# Patient Record
Sex: MALE | Race: ASIAN | NOT HISPANIC OR LATINO
[De-identification: names, ages, dates, MRNs, and addresses within clinical notes are randomized per-mention and may not be internally consistent; named-entity substitution may affect disease eponyms.]

---

## 2019-03-12 ENCOUNTER — APPOINTMENT (OUTPATIENT)
Dept: SURGICAL ONCOLOGY | Facility: CLINIC | Age: 74
End: 2019-03-12
Payer: MEDICARE

## 2019-03-12 VITALS
SYSTOLIC BLOOD PRESSURE: 112 MMHG | DIASTOLIC BLOOD PRESSURE: 75 MMHG | WEIGHT: 112 LBS | BODY MASS INDEX: 18.44 KG/M2 | TEMPERATURE: 98.5 F | HEART RATE: 90 BPM | HEIGHT: 65.35 IN | RESPIRATION RATE: 17 BRPM | OXYGEN SATURATION: 98 %

## 2019-03-12 DIAGNOSIS — Z78.9 OTHER SPECIFIED HEALTH STATUS: ICD-10-CM

## 2019-03-12 PROBLEM — Z00.00 ENCOUNTER FOR PREVENTIVE HEALTH EXAMINATION: Status: ACTIVE | Noted: 2019-03-12

## 2019-03-12 PROCEDURE — 99205 OFFICE O/P NEW HI 60 MIN: CPT

## 2019-03-12 NOTE — PHYSICAL EXAM
[Normal] : supple, no neck mass and thyroid not enlarged [Normal Neck Lymph Nodes] : normal neck lymph nodes  [Normal Supraclavicular Lymph Nodes] : normal supraclavicular lymph nodes [Normal Groin Lymph Nodes] : normal groin lymph nodes [Normal Axillary Lymph Nodes] : normal axillary lymph nodes [Normal] : oriented to person, place and time, with appropriate affect

## 2019-03-12 NOTE — REASON FOR VISIT
[Initial Consultation] : an initial consultation for [Colon Cancer] : colon cancer [Referred By: ___] : Referred By: [unfilled]

## 2019-03-12 NOTE — ASSESSMENT
[FreeTextEntry1] : 72 y/o male with newly diagnosed hepatic flexure cancer.\par No evidence of distant metastasis.\par \par Plan:  I reviewed his recent CT abdomen/pelvis with him.  There is evidence of a visible apple core lesion in CT at the hepatic flexure secondary to colonic wall thickening by known cancer.  I do not appreciate regional adenopathy.  Findings are suggestive of T3N0 colon cancer.  I have recommended minimally invasive robotic right colectomy with regional adenectomy.  Risks/benefits explained.  I am also concerned that lesion may result in colonic obstruction if not treated.  Should final pathology confirm node negative cancer, he will not require adjuvant therapy.\par \par I also called his daughter, Britta, to discuss my recommendation.

## 2019-03-12 NOTE — HISTORY OF PRESENT ILLNESS
[de-identified] : Mr. Lawrence is a 74 y/o male who presented with one month history of RLQ abdominal pain.  He denies weight loss, fever, melena, blood in stool or nausea/emesis.  Patient underwent EGD/colonoscopy 02/25/2019 which demonstrated a partially obstructing necrotic lesion with apple core shape at the hepatic flexure.  Biopsies taken demonstrated moderately differentiated adenocarcinoma.  The mass was tattooed with Mary ink.\par A initial staging CT abdomen/pelvis was performed 2/28/2019 did not show evidence of distant metastasis or regional adenopathy. Known lesion was not commented on in the official report.\par Patient had blood work done at PCP, but not available for review.

## 2019-03-12 NOTE — CONSULT LETTER
[Dear  ___] : Dear  [unfilled], [Consult Letter:] : I had the pleasure of evaluating your patient, [unfilled]. [Please see my note below.] : Please see my note below. [Consult Closing:] : Thank you very much for allowing me to participate in the care of this patient.  If you have any questions, please do not hesitate to contact me. [Sincerely,] : Sincerely, [FreeTextEntry2] : Dr. Marco Shin [FreeTextEntry3] : Samuel Krueger\par [phone] [DrEliecer  ___] : Dr. FLANNERY

## 2019-03-14 ENCOUNTER — OUTPATIENT (OUTPATIENT)
Dept: OUTPATIENT SERVICES | Facility: HOSPITAL | Age: 74
LOS: 1 days | End: 2019-03-14
Payer: MEDICARE

## 2019-03-14 ENCOUNTER — RX RENEWAL (OUTPATIENT)
Age: 74
End: 2019-03-14

## 2019-03-14 VITALS
OXYGEN SATURATION: 96 % | TEMPERATURE: 97 F | DIASTOLIC BLOOD PRESSURE: 70 MMHG | HEART RATE: 88 BPM | SYSTOLIC BLOOD PRESSURE: 122 MMHG | RESPIRATION RATE: 16 BRPM | WEIGHT: 113.98 LBS | HEIGHT: 65 IN

## 2019-03-14 DIAGNOSIS — C18.9 MALIGNANT NEOPLASM OF COLON, UNSPECIFIED: ICD-10-CM

## 2019-03-14 DIAGNOSIS — Z98.49 CATARACT EXTRACTION STATUS, UNSPECIFIED EYE: Chronic | ICD-10-CM

## 2019-03-14 DIAGNOSIS — Z98.890 OTHER SPECIFIED POSTPROCEDURAL STATES: Chronic | ICD-10-CM

## 2019-03-14 LAB
ALBUMIN SERPL ELPH-MCNC: 4 G/DL — SIGNIFICANT CHANGE UP (ref 3.3–5)
ALP SERPL-CCNC: 71 U/L — SIGNIFICANT CHANGE UP (ref 40–120)
ALT FLD-CCNC: 11 U/L — SIGNIFICANT CHANGE UP (ref 4–41)
ANION GAP SERPL CALC-SCNC: 10 MMO/L — SIGNIFICANT CHANGE UP (ref 7–14)
AST SERPL-CCNC: 23 U/L — SIGNIFICANT CHANGE UP (ref 4–40)
BILIRUB SERPL-MCNC: 0.3 MG/DL — SIGNIFICANT CHANGE UP (ref 0.2–1.2)
BLD GP AB SCN SERPL QL: NEGATIVE — SIGNIFICANT CHANGE UP
BUN SERPL-MCNC: 22 MG/DL — SIGNIFICANT CHANGE UP (ref 7–23)
CALCIUM SERPL-MCNC: 9.3 MG/DL — SIGNIFICANT CHANGE UP (ref 8.4–10.5)
CHLORIDE SERPL-SCNC: 103 MMOL/L — SIGNIFICANT CHANGE UP (ref 98–107)
CO2 SERPL-SCNC: 26 MMOL/L — SIGNIFICANT CHANGE UP (ref 22–31)
CREAT SERPL-MCNC: 0.8 MG/DL — SIGNIFICANT CHANGE UP (ref 0.5–1.3)
GLUCOSE SERPL-MCNC: 87 MG/DL — SIGNIFICANT CHANGE UP (ref 70–99)
HBA1C BLD-MCNC: 5.3 % — SIGNIFICANT CHANGE UP (ref 4–5.6)
HCT VFR BLD CALC: 37.6 % — LOW (ref 39–50)
HGB BLD-MCNC: 12.2 G/DL — LOW (ref 13–17)
MCHC RBC-ENTMCNC: 29.2 PG — SIGNIFICANT CHANGE UP (ref 27–34)
MCHC RBC-ENTMCNC: 32.4 % — SIGNIFICANT CHANGE UP (ref 32–36)
MCV RBC AUTO: 90 FL — SIGNIFICANT CHANGE UP (ref 80–100)
NRBC # FLD: 0 K/UL — LOW (ref 25–125)
PLATELET # BLD AUTO: 201 K/UL — SIGNIFICANT CHANGE UP (ref 150–400)
PMV BLD: 11 FL — SIGNIFICANT CHANGE UP (ref 7–13)
POTASSIUM SERPL-MCNC: 4.1 MMOL/L — SIGNIFICANT CHANGE UP (ref 3.5–5.3)
POTASSIUM SERPL-SCNC: 4.1 MMOL/L — SIGNIFICANT CHANGE UP (ref 3.5–5.3)
PROT SERPL-MCNC: 6.9 G/DL — SIGNIFICANT CHANGE UP (ref 6–8.3)
RBC # BLD: 4.18 M/UL — LOW (ref 4.2–5.8)
RBC # FLD: 12.5 % — SIGNIFICANT CHANGE UP (ref 10.3–14.5)
RH IG SCN BLD-IMP: POSITIVE — SIGNIFICANT CHANGE UP
SODIUM SERPL-SCNC: 139 MMOL/L — SIGNIFICANT CHANGE UP (ref 135–145)
WBC # BLD: 4.87 K/UL — SIGNIFICANT CHANGE UP (ref 3.8–10.5)
WBC # FLD AUTO: 4.87 K/UL — SIGNIFICANT CHANGE UP (ref 3.8–10.5)

## 2019-03-14 PROCEDURE — 93010 ELECTROCARDIOGRAM REPORT: CPT

## 2019-03-14 RX ORDER — METRONIDAZOLE 250 MG/1
250 TABLET ORAL
Qty: 3 | Refills: 0 | Status: ACTIVE | COMMUNITY
Start: 2019-03-14 | End: 1900-01-01

## 2019-03-14 RX ORDER — POTASSIUM CHLORIDE 1500 MG/1
20 TABLET, FILM COATED, EXTENDED RELEASE ORAL
Qty: 4 | Refills: 0 | Status: ACTIVE | COMMUNITY
Start: 2019-03-14 | End: 1900-01-01

## 2019-03-14 RX ORDER — POLYETHYLENE GLYCOL 3350, SODIUM SULFATE, SODIUM CHLORIDE, POTASSIUM CHLORIDE, ASCORBIC ACID, SODIUM ASCORBATE 7.5-2.691G
100 KIT ORAL
Qty: 1 | Refills: 0 | Status: ACTIVE | COMMUNITY
Start: 2019-03-14 | End: 1900-01-01

## 2019-03-14 RX ORDER — SODIUM CHLORIDE 9 MG/ML
1000 INJECTION, SOLUTION INTRAVENOUS
Qty: 0 | Refills: 0 | Status: DISCONTINUED | OUTPATIENT
Start: 2019-03-25 | End: 2019-03-25

## 2019-03-14 RX ORDER — SODIUM CHLORIDE 9 MG/ML
3 INJECTION INTRAMUSCULAR; INTRAVENOUS; SUBCUTANEOUS EVERY 8 HOURS
Qty: 0 | Refills: 0 | Status: DISCONTINUED | OUTPATIENT
Start: 2019-03-25 | End: 2019-03-29

## 2019-03-14 RX ORDER — NEOMYCIN SULFATE 500 MG/1
500 TABLET ORAL
Qty: 6 | Refills: 0 | Status: ACTIVE | COMMUNITY
Start: 2019-03-14 | End: 1900-01-01

## 2019-03-14 NOTE — H&P PST ADULT - NSICDXPROBLEM_GEN_ALL_CORE_FT
PROBLEM DIAGNOSES  Problem: Colon cancer  Assessment and Plan: Scheduled for Robotic Partial Colectomy on 3/25/2019.  Preop instructions given to patient via Cantonese , pt verbalized understanding  Chlorhexidine wash and GI prophylaxis provided   Medical clearance requested from surgeon. Pt saw his PCP 1 week ago  Copy of clearance requested in PST

## 2019-03-14 NOTE — H&P PST ADULT - HISTORY OF PRESENT ILLNESS
74 y/o Cantonese speaking male presents to [facility] for preoperative evaluation with diagnosis of malignant neoplasm of colon. Pt reports h/o right lower abdominal pain for 1 month. A colonoscopy/EGD on 2/5/2019 revealed "partially obstructing necrotic lesion". Scheduled for Robotic Partial Colectomy on 3/25/2019.

## 2019-03-15 ENCOUNTER — OUTPATIENT (OUTPATIENT)
Dept: OUTPATIENT SERVICES | Facility: HOSPITAL | Age: 74
LOS: 1 days | End: 2019-03-15
Payer: MEDICARE

## 2019-03-15 ENCOUNTER — RESULT REVIEW (OUTPATIENT)
Age: 74
End: 2019-03-15

## 2019-03-15 DIAGNOSIS — Z98.890 OTHER SPECIFIED POSTPROCEDURAL STATES: Chronic | ICD-10-CM

## 2019-03-15 DIAGNOSIS — C18.3 MALIGNANT NEOPLASM OF HEPATIC FLEXURE: ICD-10-CM

## 2019-03-15 DIAGNOSIS — C18.9 MALIGNANT NEOPLASM OF COLON, UNSPECIFIED: ICD-10-CM

## 2019-03-15 DIAGNOSIS — Z98.49 CATARACT EXTRACTION STATUS, UNSPECIFIED EYE: Chronic | ICD-10-CM

## 2019-03-15 PROCEDURE — 88321 CONSLTJ&REPRT SLD PREP ELSWR: CPT

## 2019-03-21 LAB — SURGICAL PATHOLOGY STUDY: SIGNIFICANT CHANGE UP

## 2019-03-24 ENCOUNTER — TRANSCRIPTION ENCOUNTER (OUTPATIENT)
Age: 74
End: 2019-03-24

## 2019-03-25 ENCOUNTER — RESULT REVIEW (OUTPATIENT)
Age: 74
End: 2019-03-25

## 2019-03-25 ENCOUNTER — INPATIENT (INPATIENT)
Facility: HOSPITAL | Age: 74
LOS: 3 days | Discharge: HOME | End: 2019-03-29
Attending: SURGERY | Admitting: SURGERY
Payer: MEDICARE

## 2019-03-25 ENCOUNTER — APPOINTMENT (OUTPATIENT)
Dept: SURGICAL ONCOLOGY | Facility: HOSPITAL | Age: 74
End: 2019-03-25

## 2019-03-25 VITALS
TEMPERATURE: 98 F | DIASTOLIC BLOOD PRESSURE: 67 MMHG | WEIGHT: 113.98 LBS | HEART RATE: 89 BPM | SYSTOLIC BLOOD PRESSURE: 101 MMHG | OXYGEN SATURATION: 100 % | HEIGHT: 65 IN | RESPIRATION RATE: 15 BRPM

## 2019-03-25 DIAGNOSIS — Z98.890 OTHER SPECIFIED POSTPROCEDURAL STATES: Chronic | ICD-10-CM

## 2019-03-25 DIAGNOSIS — C18.9 MALIGNANT NEOPLASM OF COLON, UNSPECIFIED: ICD-10-CM

## 2019-03-25 DIAGNOSIS — Z98.49 CATARACT EXTRACTION STATUS, UNSPECIFIED EYE: Chronic | ICD-10-CM

## 2019-03-25 LAB
GLUCOSE BLDC GLUCOMTR-MCNC: 75 MG/DL — SIGNIFICANT CHANGE UP (ref 70–99)
RH IG SCN BLD-IMP: POSITIVE — SIGNIFICANT CHANGE UP

## 2019-03-25 PROCEDURE — 88341 IMHCHEM/IMCYTCHM EA ADD ANTB: CPT | Mod: 26

## 2019-03-25 PROCEDURE — 44140 PARTIAL REMOVAL OF COLON: CPT

## 2019-03-25 PROCEDURE — 50715 RELEASE OF URETER: CPT | Mod: 59

## 2019-03-25 PROCEDURE — 88309 TISSUE EXAM BY PATHOLOGIST: CPT | Mod: 26

## 2019-03-25 PROCEDURE — S2900 ROBOTIC SURGICAL SYSTEM: CPT | Mod: NC

## 2019-03-25 PROCEDURE — 88342 IMHCHEM/IMCYTCHM 1ST ANTB: CPT | Mod: 26

## 2019-03-25 PROCEDURE — 88331 PATH CONSLTJ SURG 1 BLK 1SPC: CPT | Mod: 26

## 2019-03-25 PROCEDURE — 88305 TISSUE EXAM BY PATHOLOGIST: CPT | Mod: 26

## 2019-03-25 PROCEDURE — 51702 INSERT TEMP BLADDER CATH: CPT | Mod: 59

## 2019-03-25 PROCEDURE — 38747 REMOVE ABDOMINAL LYMPH NODES: CPT | Mod: 59

## 2019-03-25 PROCEDURE — 49905 OMENTAL FLAP INTRA-ABDOM: CPT

## 2019-03-25 RX ORDER — ACETAMINOPHEN 500 MG
1000 TABLET ORAL ONCE
Qty: 0 | Refills: 0 | Status: COMPLETED | OUTPATIENT
Start: 2019-03-26 | End: 2019-03-26

## 2019-03-25 RX ORDER — HYDROMORPHONE HYDROCHLORIDE 2 MG/ML
0.5 INJECTION INTRAMUSCULAR; INTRAVENOUS; SUBCUTANEOUS
Qty: 0 | Refills: 0 | Status: DISCONTINUED | OUTPATIENT
Start: 2019-03-25 | End: 2019-03-25

## 2019-03-25 RX ORDER — HEPARIN SODIUM 5000 [USP'U]/ML
5000 INJECTION INTRAVENOUS; SUBCUTANEOUS EVERY 8 HOURS
Qty: 0 | Refills: 0 | Status: DISCONTINUED | OUTPATIENT
Start: 2019-03-25 | End: 2019-03-29

## 2019-03-25 RX ORDER — ACETAMINOPHEN 500 MG
1000 TABLET ORAL ONCE
Qty: 0 | Refills: 0 | Status: COMPLETED | OUTPATIENT
Start: 2019-03-25 | End: 2019-03-25

## 2019-03-25 RX ORDER — SODIUM CHLORIDE 9 MG/ML
1000 INJECTION, SOLUTION INTRAVENOUS
Qty: 0 | Refills: 0 | Status: DISCONTINUED | OUTPATIENT
Start: 2019-03-25 | End: 2019-03-26

## 2019-03-25 RX ORDER — ONDANSETRON 8 MG/1
4 TABLET, FILM COATED ORAL ONCE
Qty: 0 | Refills: 0 | Status: DISCONTINUED | OUTPATIENT
Start: 2019-03-25 | End: 2019-03-25

## 2019-03-25 RX ORDER — HYDROMORPHONE HYDROCHLORIDE 2 MG/ML
0.25 INJECTION INTRAMUSCULAR; INTRAVENOUS; SUBCUTANEOUS
Qty: 0 | Refills: 0 | Status: DISCONTINUED | OUTPATIENT
Start: 2019-03-25 | End: 2019-03-25

## 2019-03-25 RX ADMIN — Medication 400 MILLIGRAM(S): at 23:25

## 2019-03-25 RX ADMIN — SODIUM CHLORIDE 3 MILLILITER(S): 9 INJECTION INTRAMUSCULAR; INTRAVENOUS; SUBCUTANEOUS at 14:00

## 2019-03-25 RX ADMIN — HEPARIN SODIUM 5000 UNIT(S): 5000 INJECTION INTRAVENOUS; SUBCUTANEOUS at 23:25

## 2019-03-25 RX ADMIN — HEPARIN SODIUM 5000 UNIT(S): 5000 INJECTION INTRAVENOUS; SUBCUTANEOUS at 17:56

## 2019-03-25 RX ADMIN — Medication 1000 MILLIGRAM(S): at 17:56

## 2019-03-25 RX ADMIN — SODIUM CHLORIDE 3 MILLILITER(S): 9 INJECTION INTRAMUSCULAR; INTRAVENOUS; SUBCUTANEOUS at 21:40

## 2019-03-25 RX ADMIN — Medication 400 MILLIGRAM(S): at 17:55

## 2019-03-25 NOTE — BRIEF OPERATIVE NOTE - NSICDXBRIEFPROCEDURE_GEN_ALL_CORE_FT
PROCEDURES:  Colectomy, right, robot-assisted, using da [place] 25-Mar-2019 11:14:11  Sami Longoria

## 2019-03-25 NOTE — CHART NOTE - NSCHARTNOTEFT_GEN_A_CORE
pt was seen and examined. pain controlled with medication. no n/v.  incision site dry and clean      Vital Signs Last 24 Hrs  T(C): 36.8 (25 Mar 2019 15:24), Max: 37.4 (25 Mar 2019 14:00)  T(F): 98.2 (25 Mar 2019 15:24), Max: 99.3 (25 Mar 2019 14:00)  HR: 91 (25 Mar 2019 15:24) (70 - 92)  BP: 102/50 (25 Mar 2019 15:24) (101/67 - 145/64)  BP(mean): 63 (25 Mar 2019 14:00) (63 - 87)  RR: 16 (25 Mar 2019 15:24) (15 - 24)  SpO2: 96% (25 Mar 2019 15:24) (94% - 100%)    I&O's Detail    25 Mar 2019 07:01  -  25 Mar 2019 18:50  --------------------------------------------------------  IN:    lactated ringers.: 350 mL  Total IN: 350 mL    OUT:    Indwelling Catheter - Urethral: 130 mL  Total OUT: 130 mL    Total NET: 220 mL          MEDICATIONS  (STANDING):  acetaminophen  IVPB .. 1000 milliGRAM(s) IV Intermittent once  heparin  Injectable 5000 Unit(s) SubCutaneous every 8 hours  lactated ringers. 1000 milliLiter(s) (100 mL/Hr) IV Continuous <Continuous>  sodium chloride 0.9% lock flush 3 milliLiter(s) IV Push every 8 hours    MEDICATIONS  (PRN):      LABS:                        12.1   7.16  )-----------( 186      ( 25 Mar 2019 11:42 )             38.0     03-25    139  |  104  |  26<H>  ----------------------------<  89  4.3   |  21<L>  |  0.96    Ca    8.6      25 Mar 2019 11:42        physical exam   no acute distress  resp: non labored   abd soft non distended     sp Colectomy, right, robot-assisted    Monitor GI function   AM labs   OOB   clear liquid diet

## 2019-03-25 NOTE — ASU PREOP CHECKLIST - TO WHOM
PROCEDURE DATE:  07/05/2018



PREOPERATIVE DIAGNOSIS:  Enterocutaneous fistula x2.



POSTOPERATIVE DIAGNOSES:  Enterocutaneous fistula x2 and dense

intraabdominal adhesions.



PROCEDURE PERFORMED:

1.  Exploratory laparotomy.

2.  Extensive intraabdominal lysis of adhesions.

3.  Resection of the 2 fistulas of the small bowel.

4.  Anastomosis of both _____ using AVRIL and TA staplers and resection of

the small bowel.



SURGEON:  Axel Crawford MD



ASSISTANT:  Dr. Ayala and Dr. Linda.



ANESTHESIOLOGIST:  Dr. Martínez and Dr. Muse.



ANESTHESIA:  General endotracheal anesthesia.



ESTIMATED BLOOD LOSS:  50 mL.



SPECIMENS:

1.  Portion of the small bowel with fistulous tract.

2.  Scar with the fistula and skin.



FINDINGS:  The patient is a 64-year-old male with a history of cystectomy

and urostomy.  The patient had multiple procedures for herniation of the

parastomal tissue as well as inguinal hernia repair.  The patient recently

underwent reconstruction of the new urostomy with ileal conduit which was

done few months ago.  Subsequently, the patient developed open wound and

leakage from the wound which finally stabilized to be high output

enterocutaneous fistula between small bowel and skin and those were

carefully evaluated prior to the surgery.  After the patient was stabilized

and fistula was ____, proceeded with exploratory laparotomy.



DESCRIPTION OF PROCEDURE:  The patient was brought to the operating room

and was placed on the operating table in supine position.  The patient was

connected to EKG, blood pressure and pulse oximetry monitors.  The patient

then underwent general endotracheal anesthesia and was prepped and draped

in the usual sterile fashion.  Using #15 blade, an elliptical piece of skin

was incised around the areas of the fistula.  This was carefully mobilized

after the inferior and superior fistula was blocked with balloon of the

[place] catheter.  Once we detached the skin from the underlying weak fascia

that was incised and access to the abdominal cavity was obtained where

dense intraabdominal adhesions were encountered.  A tedious dissection for

about an hour and 15 minutes took place and we were finally able to

mobilize the underlying small bowel which was attached to the fistula and

once this was completely detached and both ends of the small bowel were

located, we then proceeded with resection of the proximal portion of the

fistula and anastomosis with staples.  Once that portion of the small bowel

was resected, the fistula was sent as specimen.  The second inferior

enterocutaneous fistula was also mobilized and the small bowel detached

from the surrounding tissues and freed up.  It was noted that there was a

defect in the small bowel of about 2 cm, which was ending in the fistula. 

Those edges of that bowel were carefully trimmed and sent as a specimen and

the fistula was then repaired by repair of the small bowel incision with 2

layers of 3-0 silk.  Careful attention was paid in order to avoid no _____.

Once this was completely done, small bowel was returned into the abdominal

cavity. A Juan drain was inserted in the area of the anastomosis and

pulled out through the right lower quadrant.  We then proceeded with

closure of the abdominal wound using #1 PDS in a running fashion for the

fascia.  The subcutaneous tissues were re-approximated using 3-0 Vicryl and

the skin was closed using surgical staples.  A sterile Dermabond dressing

was applied to the wound.  The urostomy bag was attached to the ileal

conduit urostomy.  The patient was carefully awakened, extubated, and

transferred to recovery room for further observation.





__________________________________________

Axel Crawford MD







DD:  07/12/2018 0:32:47

DT:  07/12/2018 4:29:08

Job # [id number] sharonda henson rn

## 2019-03-26 LAB
HCT VFR BLD CALC: 28.1 % — LOW (ref 39–50)
HCT VFR BLD CALC: 31.6 % — LOW (ref 39–50)
HGB BLD-MCNC: 9.2 G/DL — LOW (ref 13–17)
HGB BLD-MCNC: 9.7 G/DL — LOW (ref 13–17)
MCHC RBC-ENTMCNC: 29.2 PG — SIGNIFICANT CHANGE UP (ref 27–34)
MCHC RBC-ENTMCNC: 30.4 PG — SIGNIFICANT CHANGE UP (ref 27–34)
MCHC RBC-ENTMCNC: 30.7 % — LOW (ref 32–36)
MCHC RBC-ENTMCNC: 32.7 % — SIGNIFICANT CHANGE UP (ref 32–36)
MCV RBC AUTO: 92.7 FL — SIGNIFICANT CHANGE UP (ref 80–100)
MCV RBC AUTO: 95.2 FL — SIGNIFICANT CHANGE UP (ref 80–100)
NRBC # FLD: 0 K/UL — SIGNIFICANT CHANGE UP (ref 0–0)
NRBC # FLD: 0 K/UL — SIGNIFICANT CHANGE UP (ref 0–0)
PLATELET # BLD AUTO: 135 K/UL — LOW (ref 150–400)
PLATELET # BLD AUTO: 159 K/UL — SIGNIFICANT CHANGE UP (ref 150–400)
PMV BLD: 10.8 FL — SIGNIFICANT CHANGE UP (ref 7–13)
PMV BLD: 11 FL — SIGNIFICANT CHANGE UP (ref 7–13)
RBC # BLD: 3.03 M/UL — LOW (ref 4.2–5.8)
RBC # BLD: 3.32 M/UL — LOW (ref 4.2–5.8)
RBC # FLD: 13 % — SIGNIFICANT CHANGE UP (ref 10.3–14.5)
RBC # FLD: 13.1 % — SIGNIFICANT CHANGE UP (ref 10.3–14.5)
WBC # BLD: 6.15 K/UL — SIGNIFICANT CHANGE UP (ref 3.8–10.5)
WBC # BLD: 6.26 K/UL — SIGNIFICANT CHANGE UP (ref 3.8–10.5)
WBC # FLD AUTO: 6.15 K/UL — SIGNIFICANT CHANGE UP (ref 3.8–10.5)
WBC # FLD AUTO: 6.26 K/UL — SIGNIFICANT CHANGE UP (ref 3.8–10.5)

## 2019-03-26 RX ORDER — HYDROMORPHONE HYDROCHLORIDE 2 MG/ML
1 INJECTION INTRAMUSCULAR; INTRAVENOUS; SUBCUTANEOUS EVERY 4 HOURS
Qty: 0 | Refills: 0 | Status: DISCONTINUED | OUTPATIENT
Start: 2019-03-26 | End: 2019-03-27

## 2019-03-26 RX ORDER — ACETAMINOPHEN 500 MG
1000 TABLET ORAL EVERY 6 HOURS
Qty: 0 | Refills: 0 | Status: COMPLETED | OUTPATIENT
Start: 2019-03-26 | End: 2019-03-26

## 2019-03-26 RX ORDER — SODIUM CHLORIDE 9 MG/ML
1000 INJECTION, SOLUTION INTRAVENOUS
Qty: 0 | Refills: 0 | Status: DISCONTINUED | OUTPATIENT
Start: 2019-03-26 | End: 2019-03-27

## 2019-03-26 RX ORDER — HYDROMORPHONE HYDROCHLORIDE 2 MG/ML
0.5 INJECTION INTRAMUSCULAR; INTRAVENOUS; SUBCUTANEOUS EVERY 4 HOURS
Qty: 0 | Refills: 0 | Status: DISCONTINUED | OUTPATIENT
Start: 2019-03-26 | End: 2019-03-27

## 2019-03-26 RX ADMIN — HEPARIN SODIUM 5000 UNIT(S): 5000 INJECTION INTRAVENOUS; SUBCUTANEOUS at 13:17

## 2019-03-26 RX ADMIN — Medication 1000 MILLIGRAM(S): at 00:35

## 2019-03-26 RX ADMIN — HYDROMORPHONE HYDROCHLORIDE 1 MILLIGRAM(S): 2 INJECTION INTRAMUSCULAR; INTRAVENOUS; SUBCUTANEOUS at 23:14

## 2019-03-26 RX ADMIN — HEPARIN SODIUM 5000 UNIT(S): 5000 INJECTION INTRAVENOUS; SUBCUTANEOUS at 06:00

## 2019-03-26 RX ADMIN — Medication 1000 MILLIGRAM(S): at 06:16

## 2019-03-26 RX ADMIN — SODIUM CHLORIDE 3 MILLILITER(S): 9 INJECTION INTRAMUSCULAR; INTRAVENOUS; SUBCUTANEOUS at 22:17

## 2019-03-26 RX ADMIN — Medication 1000 MILLIGRAM(S): at 13:46

## 2019-03-26 RX ADMIN — SODIUM CHLORIDE 75 MILLILITER(S): 9 INJECTION, SOLUTION INTRAVENOUS at 22:16

## 2019-03-26 RX ADMIN — Medication 400 MILLIGRAM(S): at 13:16

## 2019-03-26 RX ADMIN — Medication 400 MILLIGRAM(S): at 05:38

## 2019-03-26 RX ADMIN — SODIUM CHLORIDE 3 MILLILITER(S): 9 INJECTION INTRAMUSCULAR; INTRAVENOUS; SUBCUTANEOUS at 13:17

## 2019-03-26 RX ADMIN — SODIUM CHLORIDE 3 MILLILITER(S): 9 INJECTION INTRAMUSCULAR; INTRAVENOUS; SUBCUTANEOUS at 05:37

## 2019-03-26 RX ADMIN — HEPARIN SODIUM 5000 UNIT(S): 5000 INJECTION INTRAVENOUS; SUBCUTANEOUS at 22:17

## 2019-03-26 NOTE — PROGRESS NOTE ADULT - ASSESSMENT
74yo M with colon CA, s/p robot-assisted laparoscopic RT hemicolectomy for hepatic flexure mass (3/25)    PLAN:  -  c/w CLD, MIVF  -  d/c morin today  -  OOB to ambulate  -  pain control    DISPO:  awaiting PT    D SURGERY  [id number]

## 2019-03-26 NOTE — PROGRESS NOTE ADULT - SUBJECTIVE AND OBJECTIVE BOX
ANESTHESIA POSTOP CHECK    73y Male POSTOP DAY 1 S/P colectomy    Vital Signs Last 24 Hrs  T(C): 36.7 (26 Mar 2019 08:57), Max: 37.4 (25 Mar 2019 14:00)  T(F): 98 (26 Mar 2019 08:57), Max: 99.3 (25 Mar 2019 14:00)  HR: 65 (26 Mar 2019 08:57) (65 - 92)  BP: 113/65 (26 Mar 2019 08:57) (101/43 - 145/64)  BP(mean): 63 (25 Mar 2019 14:00) (63 - 87)  RR: 20 (26 Mar 2019 08:57) (15 - 24)  SpO2: 100% (26 Mar 2019 08:57) (94% - 100%)  I&O's Summary    25 Mar 2019 07:01  -  26 Mar 2019 07:00  --------------------------------------------------------  IN: 1550 mL / OUT: 1030 mL / NET: 520 mL        [x ] NO APPARENT ANESTHESIA COMPLICATIONS      Comments:

## 2019-03-26 NOTE — PROGRESS NOTE ADULT - SUBJECTIVE AND OBJECTIVE BOX
LIJ D SURGERY DAILY PROGRESS NOTE    SUBJECTIVE:  -  no issues overnight, tolerated CLD without n/v    OBJECTIVE:    Vital Signs Last 24 Hrs  T(C): 36.4 (26 Mar 2019 05:17), Max: 37.4 (25 Mar 2019 14:00)  T(F): 97.5 (26 Mar 2019 05:17), Max: 99.3 (25 Mar 2019 14:00)  HR: 65 (26 Mar 2019 05:17) (65 - 92)  BP: 109/43 (26 Mar 2019 05:17) (101/43 - 145/64)  BP(mean): 63 (25 Mar 2019 14:00) (63 - 87)  RR: 16 (26 Mar 2019 05:17) (15 - 24)  SpO2: 98% (26 Mar 2019 05:17) (94% - 100%)    I&O's Detail    25 Mar 2019 07:01  -  26 Mar 2019 07:00  --------------------------------------------------------  IN:    lactated ringers.: 1550 mL  Total IN: 1550 mL    OUT:    Indwelling Catheter - Urethral: 1030 mL  Total OUT: 1030 mL    Total NET: 520 mL        LABS:                        12.1   7.16  )-----------( 186      ( 25 Mar 2019 11:42 )             38.0     03-26    138  |  106  |  23  ----------------------------<  101<H>  4.3   |  25  |  0.96    Ca    7.9<L>      26 Mar 2019 06:22      EXAM:  Gen:       alert, in NAD  Lungs:       unlabored breathing  CV:       regular rate, rhythm  Abd:       nondistended, appropriately tender over surgical site                lap port sites with steri strips placed, with no drainage  Ext:        no edema

## 2019-03-27 LAB
ANION GAP SERPL CALC-SCNC: 8 MMO/L — SIGNIFICANT CHANGE UP (ref 7–14)
BUN SERPL-MCNC: 16 MG/DL — SIGNIFICANT CHANGE UP (ref 7–23)
CALCIUM SERPL-MCNC: 8 MG/DL — LOW (ref 8.4–10.5)
CHLORIDE SERPL-SCNC: 106 MMOL/L — SIGNIFICANT CHANGE UP (ref 98–107)
CO2 SERPL-SCNC: 25 MMOL/L — SIGNIFICANT CHANGE UP (ref 22–31)
CREAT SERPL-MCNC: 0.73 MG/DL — SIGNIFICANT CHANGE UP (ref 0.5–1.3)
GLUCOSE SERPL-MCNC: 154 MG/DL — HIGH (ref 70–99)
HCT VFR BLD CALC: 27.7 % — LOW (ref 39–50)
HGB BLD-MCNC: 8.7 G/DL — LOW (ref 13–17)
MAGNESIUM SERPL-MCNC: 1.8 MG/DL — SIGNIFICANT CHANGE UP (ref 1.6–2.6)
MCHC RBC-ENTMCNC: 29.6 PG — SIGNIFICANT CHANGE UP (ref 27–34)
MCHC RBC-ENTMCNC: 31.4 % — LOW (ref 32–36)
MCV RBC AUTO: 94.2 FL — SIGNIFICANT CHANGE UP (ref 80–100)
NRBC # FLD: 0 K/UL — SIGNIFICANT CHANGE UP (ref 0–0)
PHOSPHATE SERPL-MCNC: 2.2 MG/DL — LOW (ref 2.5–4.5)
PLATELET # BLD AUTO: 129 K/UL — LOW (ref 150–400)
PMV BLD: 11 FL — SIGNIFICANT CHANGE UP (ref 7–13)
POTASSIUM SERPL-MCNC: 3.8 MMOL/L — SIGNIFICANT CHANGE UP (ref 3.5–5.3)
POTASSIUM SERPL-SCNC: 3.8 MMOL/L — SIGNIFICANT CHANGE UP (ref 3.5–5.3)
RBC # BLD: 2.94 M/UL — LOW (ref 4.2–5.8)
RBC # FLD: 12.9 % — SIGNIFICANT CHANGE UP (ref 10.3–14.5)
SODIUM SERPL-SCNC: 139 MMOL/L — SIGNIFICANT CHANGE UP (ref 135–145)
WBC # BLD: 6.14 K/UL — SIGNIFICANT CHANGE UP (ref 3.8–10.5)
WBC # FLD AUTO: 6.14 K/UL — SIGNIFICANT CHANGE UP (ref 3.8–10.5)

## 2019-03-27 RX ORDER — MAGNESIUM SULFATE 500 MG/ML
1 VIAL (ML) INJECTION ONCE
Qty: 0 | Refills: 0 | Status: COMPLETED | OUTPATIENT
Start: 2019-03-27 | End: 2019-03-27

## 2019-03-27 RX ORDER — OXYCODONE HYDROCHLORIDE 5 MG/1
5 TABLET ORAL EVERY 4 HOURS
Qty: 0 | Refills: 0 | Status: DISCONTINUED | OUTPATIENT
Start: 2019-03-27 | End: 2019-03-29

## 2019-03-27 RX ORDER — ACETAMINOPHEN 500 MG
1000 TABLET ORAL ONCE
Qty: 0 | Refills: 0 | Status: COMPLETED | OUTPATIENT
Start: 2019-03-27 | End: 2019-03-27

## 2019-03-27 RX ORDER — OXYCODONE HYDROCHLORIDE 5 MG/1
10 TABLET ORAL EVERY 6 HOURS
Qty: 0 | Refills: 0 | Status: DISCONTINUED | OUTPATIENT
Start: 2019-03-27 | End: 2019-03-29

## 2019-03-27 RX ORDER — POTASSIUM PHOSPHATE, MONOBASIC POTASSIUM PHOSPHATE, DIBASIC 236; 224 MG/ML; MG/ML
15 INJECTION, SOLUTION INTRAVENOUS ONCE
Qty: 0 | Refills: 0 | Status: COMPLETED | OUTPATIENT
Start: 2019-03-27 | End: 2019-03-27

## 2019-03-27 RX ADMIN — Medication 1000 MILLIGRAM(S): at 23:00

## 2019-03-27 RX ADMIN — HEPARIN SODIUM 5000 UNIT(S): 5000 INJECTION INTRAVENOUS; SUBCUTANEOUS at 21:51

## 2019-03-27 RX ADMIN — SODIUM CHLORIDE 3 MILLILITER(S): 9 INJECTION INTRAMUSCULAR; INTRAVENOUS; SUBCUTANEOUS at 05:16

## 2019-03-27 RX ADMIN — HEPARIN SODIUM 5000 UNIT(S): 5000 INJECTION INTRAVENOUS; SUBCUTANEOUS at 05:15

## 2019-03-27 RX ADMIN — SODIUM CHLORIDE 3 MILLILITER(S): 9 INJECTION INTRAMUSCULAR; INTRAVENOUS; SUBCUTANEOUS at 14:31

## 2019-03-27 RX ADMIN — SODIUM CHLORIDE 75 MILLILITER(S): 9 INJECTION, SOLUTION INTRAVENOUS at 08:16

## 2019-03-27 RX ADMIN — Medication 100 GRAM(S): at 12:38

## 2019-03-27 RX ADMIN — HYDROMORPHONE HYDROCHLORIDE 1 MILLIGRAM(S): 2 INJECTION INTRAMUSCULAR; INTRAVENOUS; SUBCUTANEOUS at 00:00

## 2019-03-27 RX ADMIN — SODIUM CHLORIDE 3 MILLILITER(S): 9 INJECTION INTRAMUSCULAR; INTRAVENOUS; SUBCUTANEOUS at 21:51

## 2019-03-27 RX ADMIN — HEPARIN SODIUM 5000 UNIT(S): 5000 INJECTION INTRAVENOUS; SUBCUTANEOUS at 12:38

## 2019-03-27 RX ADMIN — Medication 100 MILLIGRAM(S): at 21:53

## 2019-03-27 RX ADMIN — POTASSIUM PHOSPHATE, MONOBASIC POTASSIUM PHOSPHATE, DIBASIC 62.5 MILLIMOLE(S): 236; 224 INJECTION, SOLUTION INTRAVENOUS at 12:37

## 2019-03-27 RX ADMIN — Medication 400 MILLIGRAM(S): at 21:50

## 2019-03-27 NOTE — PROGRESS NOTE ADULT - SUBJECTIVE AND OBJECTIVE BOX
LIJ D SURGERY DAILY PROGRESS NOTE    SUBJECTIVE:  -  had one bloody bm yest evening, then several normal non-bloody bm overnight  -  ivis CLD without n/v    OBJECTIVE:    Vital Signs Last 24 Hrs  T(C): 36.4 (27 Mar 2019 05:15), Max: 37.1 (26 Mar 2019 21:18)  T(F): 97.6 (27 Mar 2019 05:15), Max: 98.7 (26 Mar 2019 21:18)  HR: 80 (27 Mar 2019 05:15) (74 - 85)  BP: 113/52 (27 Mar 2019 05:15) (113/52 - 131/65)  BP(mean): --  RR: 17 (27 Mar 2019 05:15) (17 - 18)  SpO2: 98% (27 Mar 2019 05:15) (94% - 99%)    I&O's Detail    26 Mar 2019 07:01  -  27 Mar 2019 07:00  --------------------------------------------------------  IN:    dextrose 5% + sodium chloride 0.45%.: 750 mL  Total IN: 750 mL    OUT:    Indwelling Catheter - Urethral: 150 mL  Total OUT: 150 mL    Total NET: 600 mL        LABS:                        8.7    6.14  )-----------( 129      ( 27 Mar 2019 06:22 )             27.7     03-27    139  |  106  |  16  ----------------------------<  154<H>  3.8   |  25  |  0.73    Ca    8.0<L>      27 Mar 2019 06:22  Phos  2.2     03-27  Mg     1.8     03-27    EXAM:  Gen:       alert, in NAD  Lungs:       unlabored breathing  CV:       regular rate, rhythm  Abd:       nondistended, appropriately tender over surgical site                lap port sites with steri strips placed, with no drainage  Ext:        no edema

## 2019-03-27 NOTE — PROGRESS NOTE ADULT - ASSESSMENT
74yo M with colon CA, s/p robot-assisted laparoscopic RT hemicolectomy for hepatic flexure mass (3/25)    PLAN:  -  progress to LRD  -  monitor for bloody bm  -  OOB to ambulate  -  pain control    DISPO:  home    D SURGERY  [id number]

## 2019-03-28 ENCOUNTER — TRANSCRIPTION ENCOUNTER (OUTPATIENT)
Age: 74
End: 2019-03-28

## 2019-03-28 LAB
ANION GAP SERPL CALC-SCNC: 7 MMO/L — SIGNIFICANT CHANGE UP (ref 7–14)
BUN SERPL-MCNC: 13 MG/DL — SIGNIFICANT CHANGE UP (ref 7–23)
CALCIUM SERPL-MCNC: 8.1 MG/DL — LOW (ref 8.4–10.5)
CHLORIDE SERPL-SCNC: 106 MMOL/L — SIGNIFICANT CHANGE UP (ref 98–107)
CO2 SERPL-SCNC: 26 MMOL/L — SIGNIFICANT CHANGE UP (ref 22–31)
CREAT SERPL-MCNC: 0.67 MG/DL — SIGNIFICANT CHANGE UP (ref 0.5–1.3)
GLUCOSE SERPL-MCNC: 96 MG/DL — SIGNIFICANT CHANGE UP (ref 70–99)
HCT VFR BLD CALC: 25.7 % — LOW (ref 39–50)
HGB BLD-MCNC: 8.3 G/DL — LOW (ref 13–17)
MAGNESIUM SERPL-MCNC: 1.9 MG/DL — SIGNIFICANT CHANGE UP (ref 1.6–2.6)
MCHC RBC-ENTMCNC: 29.4 PG — SIGNIFICANT CHANGE UP (ref 27–34)
MCHC RBC-ENTMCNC: 32.3 % — SIGNIFICANT CHANGE UP (ref 32–36)
MCV RBC AUTO: 91.1 FL — SIGNIFICANT CHANGE UP (ref 80–100)
NRBC # FLD: 0 K/UL — SIGNIFICANT CHANGE UP (ref 0–0)
PHOSPHATE SERPL-MCNC: 2.1 MG/DL — LOW (ref 2.5–4.5)
PLATELET # BLD AUTO: 136 K/UL — LOW (ref 150–400)
PMV BLD: 11.5 FL — SIGNIFICANT CHANGE UP (ref 7–13)
POTASSIUM SERPL-MCNC: 3.7 MMOL/L — SIGNIFICANT CHANGE UP (ref 3.5–5.3)
POTASSIUM SERPL-SCNC: 3.7 MMOL/L — SIGNIFICANT CHANGE UP (ref 3.5–5.3)
RBC # BLD: 2.82 M/UL — LOW (ref 4.2–5.8)
RBC # FLD: 12.9 % — SIGNIFICANT CHANGE UP (ref 10.3–14.5)
SODIUM SERPL-SCNC: 139 MMOL/L — SIGNIFICANT CHANGE UP (ref 135–145)
WBC # BLD: 5.34 K/UL — SIGNIFICANT CHANGE UP (ref 3.8–10.5)
WBC # FLD AUTO: 5.34 K/UL — SIGNIFICANT CHANGE UP (ref 3.8–10.5)

## 2019-03-28 RX ORDER — SODIUM,POTASSIUM PHOSPHATES 278-250MG
1 POWDER IN PACKET (EA) ORAL
Qty: 0 | Refills: 0 | Status: DISCONTINUED | OUTPATIENT
Start: 2019-03-28 | End: 2019-03-28

## 2019-03-28 RX ORDER — POTASSIUM PHOSPHATE, MONOBASIC POTASSIUM PHOSPHATE, DIBASIC 236; 224 MG/ML; MG/ML
15 INJECTION, SOLUTION INTRAVENOUS ONCE
Qty: 0 | Refills: 0 | Status: COMPLETED | OUTPATIENT
Start: 2019-03-28 | End: 2019-03-28

## 2019-03-28 RX ORDER — MAGNESIUM SULFATE 500 MG/ML
1 VIAL (ML) INJECTION ONCE
Qty: 0 | Refills: 0 | Status: COMPLETED | OUTPATIENT
Start: 2019-03-28 | End: 2019-03-28

## 2019-03-28 RX ORDER — ACETAMINOPHEN 500 MG
650 TABLET ORAL EVERY 6 HOURS
Qty: 0 | Refills: 0 | Status: DISCONTINUED | OUTPATIENT
Start: 2019-03-28 | End: 2019-03-29

## 2019-03-28 RX ADMIN — SODIUM CHLORIDE 3 MILLILITER(S): 9 INJECTION INTRAMUSCULAR; INTRAVENOUS; SUBCUTANEOUS at 05:12

## 2019-03-28 RX ADMIN — Medication 650 MILLIGRAM(S): at 10:15

## 2019-03-28 RX ADMIN — HEPARIN SODIUM 5000 UNIT(S): 5000 INJECTION INTRAVENOUS; SUBCUTANEOUS at 22:00

## 2019-03-28 RX ADMIN — Medication 650 MILLIGRAM(S): at 09:36

## 2019-03-28 RX ADMIN — HEPARIN SODIUM 5000 UNIT(S): 5000 INJECTION INTRAVENOUS; SUBCUTANEOUS at 14:13

## 2019-03-28 RX ADMIN — HEPARIN SODIUM 5000 UNIT(S): 5000 INJECTION INTRAVENOUS; SUBCUTANEOUS at 05:12

## 2019-03-28 RX ADMIN — Medication 100 GRAM(S): at 16:13

## 2019-03-28 RX ADMIN — SODIUM CHLORIDE 3 MILLILITER(S): 9 INJECTION INTRAMUSCULAR; INTRAVENOUS; SUBCUTANEOUS at 13:20

## 2019-03-28 RX ADMIN — POTASSIUM PHOSPHATE, MONOBASIC POTASSIUM PHOSPHATE, DIBASIC 62.5 MILLIMOLE(S): 236; 224 INJECTION, SOLUTION INTRAVENOUS at 16:12

## 2019-03-28 RX ADMIN — SODIUM CHLORIDE 3 MILLILITER(S): 9 INJECTION INTRAMUSCULAR; INTRAVENOUS; SUBCUTANEOUS at 22:00

## 2019-03-28 NOTE — DISCHARGE NOTE PROVIDER - HOSPITAL COURSE
Patient is a 73 year old male who presented for evaluation with diagnosis of malignant neoplasm of colon.  A colonoscopy / EGD done on 2/5/2019 revealed "partially obstructing necrotic lesion."  Patient was scheduled for robotic partial colectomy on 3/25/2019.        Patient underwent a right robot-assisted colectomy on 3/25/19.          On 3/26/19 patient was advanced to a clear liquid diet which he tolerated well.  Jean catheter was removed and patient voided with no difficulty.  Overnight, patient had a bloody bowel movement x1 episode.  Hemoglobin and hematocrit were stable.  No further episodes were noted.        On 3/27/19 patient was advanced to a low residual diet and tolerated it well.          On 3/28/19 patient remained stable with no reports of bloody bowel movements.  Patient ate well, ambulated and pain remained well controlled.        Per attending, patient deemed stable for discharge to home. Patient is a 73 year old male who presented for evaluation with diagnosis of malignant neoplasm of colon.  A colonoscopy / EGD done on 2/5/2019 revealed "partially obstructing necrotic lesion."  Patient was scheduled for robotic partial colectomy on 3/25/2019.        Patient underwent a right robot-assisted colectomy on 3/25/19.          On 3/26/19 patient was advanced to a clear liquid diet which he tolerated well.  Jean catheter was removed and patient voided with no difficulty.  Overnight, patient had a bloody bowel movement x1 episode.  Hemoglobin and hematocrit were stable.  No further episodes were noted.        On 3/27/19 patient was advanced to a low residual diet and tolerated it well.          On 3/28/19 and 3/29/19 patient remained stable with no reports of bloody bowel movements.  Patient ate well, ambulated and pain remained well controlled.        Per attending, patient deemed stable for discharge to home.

## 2019-03-28 NOTE — DISCHARGE NOTE PROVIDER - CARE PROVIDERS DIRECT ADDRESSES
[email or web address]

## 2019-03-28 NOTE — DISCHARGE NOTE PROVIDER - CARE PROVIDER_API CALL
Samuel Krueger)  Surgery  [address] [place] [address]  Phone: [phone]  Fax: [phone]  Follow Up Time: 1 week

## 2019-03-28 NOTE — DISCHARGE NOTE PROVIDER - NSDCCPCAREPLAN_GEN_ALL_CORE_FT
PRINCIPAL DISCHARGE DIAGNOSIS  Diagnosis: Colon cancer  Assessment and Plan of Treatment: WOUND CARE:  Please keep incisions clean and dry.  Please do not scrub or rub incisions.  Do not use lotion or powder on incisions.   BATHING: Please do not submerge wound underwater.  You may shower and/or sponge bathe.  ACTIVITY: No heavy lifting or straining.  Otherwise, you may return to your usual level of physical activity.  If you are taking narcotic pain medication (such as Percocet) DO NOT drive a car, operate machinery or make important decisions.  DIET: Return to your usual diet.  NOTIFY YOUR SURGEON IF: You have any bleeding that does not stop, any pus draining from your wound(s), any fever (over 100.4 F) or chills, persistent nausea/vomiting, persistent diarrhea or if your pain is not controlled on your discharge pain medications.  FOLLOW-UP: Please follow up with your primary care physician in one week regarding your hospitalization.  Please follow-up with your surgeon, Dr. Krueger within 7 days following discharge.  Please call [phone] to schedule an appointment. PRINCIPAL DISCHARGE DIAGNOSIS  Diagnosis: Colon cancer  Assessment and Plan of Treatment: WOUND CARE:  Please keep incisions clean and dry.  Please do not scrub or rub incisions.  Do not use lotion or powder on incisions.   BATHING: Please do not submerge wound underwater.  You may shower and/or sponge bathe.  ACTIVITY: No heavy lifting or straining.  Otherwise, you may return to your usual level of physical activity.  If you are taking narcotic pain medication (such as Oxycodone / Percocet) DO NOT drive a car, operate machinery or make important decisions.  DIET: Return to your usual diet.  NOTIFY YOUR SURGEON IF: You have any bleeding that does not stop, any pus draining from your wound(s), any fever (over 100.4 F) or chills, persistent nausea/vomiting, persistent diarrhea or if your pain is not controlled on your discharge pain medications.  FOLLOW-UP: Please follow up with your primary care physician in one week regarding your hospitalization.  Please follow-up with your surgeon, Dr. Krueger within 7 days following discharge.  Please call [phone] to schedule an appointment.

## 2019-03-28 NOTE — PROGRESS NOTE ADULT - SUBJECTIVE AND OBJECTIVE BOX
D TEAM SURGERY PROGRESS NOTE    SUBJECTIVE: Patient seen and examined at bedside, patient without complaints. Patient tolerating diet.     Vital Signs Last 24 Hrs  T(C): 36.7 (28 Mar 2019 09:00), Max: 36.9 (27 Mar 2019 17:22)  T(F): 98 (28 Mar 2019 09:00), Max: 98.4 (27 Mar 2019 17:22)  HR: 73 (28 Mar 2019 09:00) (72 - 85)  BP: 129/61 (28 Mar 2019 09:00) (116/60 - 129/61)  BP(mean): --  RR: 18 (28 Mar 2019 09:00) (17 - 18)  SpO2: 99% (28 Mar 2019 09:00) (95% - 99%)  I&O's Detail    27 Mar 2019 07:01  -  28 Mar 2019 07:00  --------------------------------------------------------  IN:  Total IN: 0 mL    OUT:    Voided: 350 mL  Total OUT: 350 mL    Total NET: -350 mL        MEDICATIONS  (STANDING):  heparin  Injectable 5000 Unit(s) SubCutaneous every 8 hours  magnesium sulfate  IVPB 1 Gram(s) IV Intermittent once  potassium phosphate IVPB 15 milliMole(s) IV Intermittent once  sodium chloride 0.9% lock flush 3 milliLiter(s) IV Push every 8 hours    MEDICATIONS  (PRN):  acetaminophen   Tablet .. 650 milliGRAM(s) Oral every 6 hours PRN Mild Pain (1 - 3)  guaiFENesin    Syrup 100 milliGRAM(s) Oral every 6 hours PRN Cough  oxyCODONE    IR 5 milliGRAM(s) Oral every 4 hours PRN Moderate Pain (4 - 6)  oxyCODONE    IR 10 milliGRAM(s) Oral every 6 hours PRN Severe Pain (7 - 10)      Physical Exam  General: A&Ox3, NAD  Abdominal: soft, NT, ND    LABS:                        8.3    5.34  )-----------( 136      ( 28 Mar 2019 06:30 )             25.7     03-28    139  |  106  |  13  ----------------------------<  96  3.7   |  26  |  0.67    Ca    8.1<L>      28 Mar 2019 06:30  Phos  2.1     03-28  Mg     1.9     03-28        74yo M with colon CA, s/p robot-assisted laparoscopic RT hemicolectomy for hepatic flexure mass (3/25)    PLAN:  -  Cont. LRD  -  monitor for bloody bm  -  OOB to ambulate  -  pain control  -d/w attending    DISPO:  home tomorrow    D SURGERY  [id number]

## 2019-03-29 ENCOUNTER — TRANSCRIPTION ENCOUNTER (OUTPATIENT)
Age: 74
End: 2019-03-29

## 2019-03-29 VITALS
HEART RATE: 76 BPM | SYSTOLIC BLOOD PRESSURE: 113 MMHG | RESPIRATION RATE: 16 BRPM | OXYGEN SATURATION: 97 % | DIASTOLIC BLOOD PRESSURE: 56 MMHG | TEMPERATURE: 99 F

## 2019-03-29 LAB
ANION GAP SERPL CALC-SCNC: 7 MMO/L — SIGNIFICANT CHANGE UP (ref 7–14)
BUN SERPL-MCNC: 17 MG/DL — SIGNIFICANT CHANGE UP (ref 7–23)
CALCIUM SERPL-MCNC: 8.4 MG/DL — SIGNIFICANT CHANGE UP (ref 8.4–10.5)
CHLORIDE SERPL-SCNC: 108 MMOL/L — HIGH (ref 98–107)
CO2 SERPL-SCNC: 26 MMOL/L — SIGNIFICANT CHANGE UP (ref 22–31)
CREAT SERPL-MCNC: 0.79 MG/DL — SIGNIFICANT CHANGE UP (ref 0.5–1.3)
GLUCOSE SERPL-MCNC: 101 MG/DL — HIGH (ref 70–99)
HCT VFR BLD CALC: 25.4 % — LOW (ref 39–50)
HGB BLD-MCNC: 8.3 G/DL — LOW (ref 13–17)
MAGNESIUM SERPL-MCNC: 2.1 MG/DL — SIGNIFICANT CHANGE UP (ref 1.6–2.6)
MCHC RBC-ENTMCNC: 30.1 PG — SIGNIFICANT CHANGE UP (ref 27–34)
MCHC RBC-ENTMCNC: 32.7 % — SIGNIFICANT CHANGE UP (ref 32–36)
MCV RBC AUTO: 92 FL — SIGNIFICANT CHANGE UP (ref 80–100)
NRBC # FLD: 0 K/UL — SIGNIFICANT CHANGE UP (ref 0–0)
PHOSPHATE SERPL-MCNC: 2.7 MG/DL — SIGNIFICANT CHANGE UP (ref 2.5–4.5)
PLATELET # BLD AUTO: 146 K/UL — LOW (ref 150–400)
PMV BLD: 11.1 FL — SIGNIFICANT CHANGE UP (ref 7–13)
POTASSIUM SERPL-MCNC: 3.9 MMOL/L — SIGNIFICANT CHANGE UP (ref 3.5–5.3)
POTASSIUM SERPL-SCNC: 3.9 MMOL/L — SIGNIFICANT CHANGE UP (ref 3.5–5.3)
RBC # BLD: 2.76 M/UL — LOW (ref 4.2–5.8)
RBC # FLD: 13.2 % — SIGNIFICANT CHANGE UP (ref 10.3–14.5)
SODIUM SERPL-SCNC: 141 MMOL/L — SIGNIFICANT CHANGE UP (ref 135–145)
WBC # BLD: 5.4 K/UL — SIGNIFICANT CHANGE UP (ref 3.8–10.5)
WBC # FLD AUTO: 5.4 K/UL — SIGNIFICANT CHANGE UP (ref 3.8–10.5)

## 2019-03-29 RX ORDER — OXYCODONE HYDROCHLORIDE 5 MG/1
1 TABLET ORAL
Qty: 10 | Refills: 0
Start: 2019-03-29

## 2019-03-29 RX ORDER — POTASSIUM PHOSPHATE, MONOBASIC POTASSIUM PHOSPHATE, DIBASIC 236; 224 MG/ML; MG/ML
15 INJECTION, SOLUTION INTRAVENOUS ONCE
Qty: 0 | Refills: 0 | Status: COMPLETED | OUTPATIENT
Start: 2019-03-29 | End: 2019-03-29

## 2019-03-29 RX ADMIN — Medication 650 MILLIGRAM(S): at 05:13

## 2019-03-29 RX ADMIN — POTASSIUM PHOSPHATE, MONOBASIC POTASSIUM PHOSPHATE, DIBASIC 62.5 MILLIMOLE(S): 236; 224 INJECTION, SOLUTION INTRAVENOUS at 10:46

## 2019-03-29 RX ADMIN — SODIUM CHLORIDE 3 MILLILITER(S): 9 INJECTION INTRAMUSCULAR; INTRAVENOUS; SUBCUTANEOUS at 05:11

## 2019-03-29 RX ADMIN — Medication 650 MILLIGRAM(S): at 06:05

## 2019-03-29 NOTE — DISCHARGE NOTE NURSING/CASE MANAGEMENT/SOCIAL WORK - NSDCPNDISPN_GEN_ALL_CORE
Activities of daily living, including home environment that might     exacerbate pain or reduce effectiveness of the pain management plan of care as well as strategies to address these issues/Side effects of pain management treatment/Education provided on the pain management plan of care

## 2019-03-29 NOTE — DISCHARGE NOTE NURSING/CASE MANAGEMENT/SOCIAL WORK - NSDCDPATPORTLINK_GEN_ALL_CORE
You can access the [facility] Portal, offered by [facility], by registering with the following website: [email or web address]

## 2019-03-29 NOTE — PROGRESS NOTE ADULT - SUBJECTIVE AND OBJECTIVE BOX
no acute events overnight. DC home today     Vital Signs Last 24 Hrs  T(C): 37.3 (29 Mar 2019 11:37), Max: 37.3 (29 Mar 2019 11:37)  T(F): 99.1 (29 Mar 2019 11:37), Max: 99.1 (29 Mar 2019 11:37)  HR: 76 (29 Mar 2019 11:37) (64 - 90)  BP: 113/56 (29 Mar 2019 11:37) (108/57 - 120/64)  BP(mean): --  RR: 16 (29 Mar 2019 11:37) (16 - 18)  SpO2: 97% (29 Mar 2019 11:37) (93% - 99%)    I&O's Detail      MEDICATIONS  (STANDING):  heparin  Injectable 5000 Unit(s) SubCutaneous every 8 hours  sodium chloride 0.9% lock flush 3 milliLiter(s) IV Push every 8 hours    MEDICATIONS  (PRN):  acetaminophen   Tablet .. 650 milliGRAM(s) Oral every 6 hours PRN Mild Pain (1 - 3)  guaiFENesin    Syrup 100 milliGRAM(s) Oral every 6 hours PRN Cough  oxyCODONE    IR 5 milliGRAM(s) Oral every 4 hours PRN Moderate Pain (4 - 6)  oxyCODONE    IR 10 milliGRAM(s) Oral every 6 hours PRN Severe Pain (7 - 10)      LABS:                        8.3    5.40  )-----------( 146      ( 29 Mar 2019 06:19 )             25.4     03-29    141  |  108<H>  |  17  ----------------------------<  101<H>  3.9   |  26  |  0.79    Ca    8.4      29 Mar 2019 06:19  Phos  2.7     03-29  Mg     2.1     03-29

## 2019-03-29 NOTE — DISCHARGE NOTE NURSING/CASE MANAGEMENT/SOCIAL WORK - NSDCPNINST_GEN_ALL_CORE
You may shower let water and soap run over and pat dry, Steri strips will fill off on it's own. Keep incision clean and dry, if you notice purulent discharge or spike fever anything greater 100.4, notify your doctor or go to the nearest ER.

## 2019-04-01 PROBLEM — C18.9 MALIGNANT NEOPLASM OF COLON, UNSPECIFIED: Chronic | Status: ACTIVE | Noted: 2019-03-14

## 2019-04-01 LAB — SURGICAL PATHOLOGY STUDY: SIGNIFICANT CHANGE UP

## 2019-04-09 ENCOUNTER — APPOINTMENT (OUTPATIENT)
Dept: SURGICAL ONCOLOGY | Facility: CLINIC | Age: 74
End: 2019-04-09
Payer: MEDICARE

## 2019-04-09 VITALS
BODY MASS INDEX: 18.11 KG/M2 | TEMPERATURE: 97.7 F | SYSTOLIC BLOOD PRESSURE: 107 MMHG | HEART RATE: 79 BPM | WEIGHT: 110 LBS | DIASTOLIC BLOOD PRESSURE: 71 MMHG | RESPIRATION RATE: 17 BRPM | OXYGEN SATURATION: 98 %

## 2019-04-09 PROCEDURE — 99024 POSTOP FOLLOW-UP VISIT: CPT

## 2019-04-11 NOTE — ASSESSMENT
[FreeTextEntry1] : 74 y/o with Stage 2 hepatic flexure cancer, s/p right colectomy.\par Doing well.\par \par Plan:  Continue diet as tolerated.  Follow up in three months.  Referral to medical oncologist, Dr. Jak Delgado.

## 2019-07-02 ENCOUNTER — APPOINTMENT (OUTPATIENT)
Dept: SURGICAL ONCOLOGY | Facility: CLINIC | Age: 74
End: 2019-07-02

## 2019-07-16 ENCOUNTER — APPOINTMENT (OUTPATIENT)
Dept: SURGICAL ONCOLOGY | Facility: CLINIC | Age: 74
End: 2019-07-16
Payer: MEDICARE

## 2019-07-16 VITALS
BODY MASS INDEX: 18.77 KG/M2 | OXYGEN SATURATION: 99 % | RESPIRATION RATE: 18 BRPM | DIASTOLIC BLOOD PRESSURE: 75 MMHG | TEMPERATURE: 98.3 F | SYSTOLIC BLOOD PRESSURE: 121 MMHG | WEIGHT: 114 LBS | HEIGHT: 65.5 IN | HEART RATE: 77 BPM

## 2019-07-16 PROCEDURE — 99214 OFFICE O/P EST MOD 30 MIN: CPT

## 2019-07-16 NOTE — ASSESSMENT
[FreeTextEntry1] : Doing well after robotic right colectomy.\par He is at risk for disease relapse given pathology.\par \par Plan: Follow up recent CT scan and PET/CT.  Will need to continue scheduled blood work to evaluate CEA and hepatic panel every 3 - 6 months.  Follow up in 4 months.

## 2019-07-16 NOTE — PHYSICAL EXAM
[FreeTextEntry1] : Abdomen: soft, incisions well healed.  No masses palpated. [Normal] : supple, no neck mass and thyroid not enlarged [Normal Neck Lymph Nodes] : normal neck lymph nodes  [Normal Supraclavicular Lymph Nodes] : normal supraclavicular lymph nodes [Normal Groin Lymph Nodes] : normal groin lymph nodes [Normal Axillary Lymph Nodes] : normal axillary lymph nodes [Normal] : oriented to person, place and time, with appropriate affect

## 2019-07-16 NOTE — HISTORY OF PRESENT ILLNESS
[de-identified] : Mr. Lawrence is a 72 y/o male who presented with one month history of RLQ abdominal pain.  He denies weight loss, fever, melena, blood in stool or nausea/emesis.  Patient underwent EGD/colonoscopy 02/25/2019 which demonstrated a partially obstructing necrotic lesion with apple core shape at the hepatic flexure.  Biopsies taken demonstrated moderately differentiated adenocarcinoma.  The mass was tattooed with Mary ink.\par A initial staging CT abdomen/pelvis was performed 2/28/2019 did not show evidence of distant metastasis or regional adenopathy. Known lesion was not commented on in the official report.\par Patient had blood work done at [facility], but not available for review.\par \par 04/09/2019:  Patient is s/p robotic right colectomy 03/25/2019.  He had an uneventful post-operative course.  He reports feeling well and is tolerating diet.  He denies nausea/emesis and is having regular bowel movements.\par Pathology:  T3N0 moderately differentiated adenocarcinoma of colon with focus of poorly differentiated cancer.  Seventeen lymph nodes negative, margins negative.\par \par 07/16/2019: Patient presents for follow up.  He reports recovering well.  Main complaints are constipation and low energy.  He has a good appetite and has had weight gain.  He was evaluated by medical oncologist, Dr. Olga Rodriguez, who recently sent him for CT abdomen/pelvis and PET/CT for which reports are pending because of blood work abnormality.  He offers no other complaints.  Recent hepatic panel is within normal limits.  CEA level not available.

## 2019-07-30 ENCOUNTER — APPOINTMENT (OUTPATIENT)
Dept: SURGICAL ONCOLOGY | Facility: CLINIC | Age: 74
End: 2019-07-30
Payer: MEDICARE

## 2019-07-30 VITALS
WEIGHT: 113 LBS | DIASTOLIC BLOOD PRESSURE: 74 MMHG | HEART RATE: 78 BPM | RESPIRATION RATE: 18 BRPM | OXYGEN SATURATION: 99 % | BODY MASS INDEX: 18.52 KG/M2 | TEMPERATURE: 97.6 F | SYSTOLIC BLOOD PRESSURE: 119 MMHG

## 2019-07-30 DIAGNOSIS — C18.9 MALIGNANT NEOPLASM OF COLON, UNSPECIFIED: ICD-10-CM

## 2019-07-30 PROCEDURE — 99214 OFFICE O/P EST MOD 30 MIN: CPT

## 2019-07-30 NOTE — PHYSICAL EXAM
[FreeTextEntry1] : Abdomen: soft, incisions well healed.  No masses palpated. [Normal] : supple, no neck mass and thyroid not enlarged [Normal Groin Lymph Nodes] : normal groin lymph nodes [Normal Neck Lymph Nodes] : normal neck lymph nodes  [Normal Supraclavicular Lymph Nodes] : normal supraclavicular lymph nodes [Normal Axillary Lymph Nodes] : normal axillary lymph nodes [Normal] : grossly intact

## 2019-07-30 NOTE — HISTORY OF PRESENT ILLNESS
[de-identified] : Mr. Lawrence is a 72 y/o male who presented with one month history of RLQ abdominal pain.  He denies weight loss, fever, melena, blood in stool or nausea/emesis.  Patient underwent EGD/colonoscopy 02/25/2019 which demonstrated a partially obstructing necrotic lesion with apple core shape at the hepatic flexure.  Biopsies taken demonstrated moderately differentiated adenocarcinoma.  The mass was tattooed with Mary ink.\par A initial staging CT abdomen/pelvis was performed 2/28/2019 did not show evidence of distant metastasis or regional adenopathy. Known lesion was not commented on in the official report.\par Patient had blood work done at [facility], but not available for review.\par \par 04/09/2019:  Patient is s/p robotic right colectomy 03/25/2019.  He had an uneventful post-operative course.  He reports feeling well and is tolerating diet.  He denies nausea/emesis and is having regular bowel movements.\par Pathology:  T3N0 moderately differentiated adenocarcinoma of colon with focus of poorly differentiated cancer.  Seventeen lymph nodes negative, margins negative.\par \par 07/16/2019: Patient presents for follow up.  He reports recovering well.  Main complaints are constipation and low energy.  He has a good appetite and has had weight gain.  He was evaluated by medical oncologist, Dr. Olga Rodriguez, who recently sent him for CT abdomen/pelvis and PET/CT for which reports are pending because of blood work abnormality.  He offers no other complaints.  Recent hepatic panel is within normal limits.  CEA level not available.\par \par 07/30/2019: Patient presents for discussion of PET/CT results.  Recent CEA was found to be elevated at 7.3 ng/mL.  PET/CT scan shows area of soft tissue nodularity along the right paracolic gutter  with SUV 2.1, most notably a 15 x 5 mm region.  He offers no abdominal complaints.

## 2019-07-30 NOTE — HISTORY OF PRESENT ILLNESS
[de-identified] : Mr. Lawrence is a 72 y/o male who presented with one month history of RLQ abdominal pain.  He denies weight loss, fever, melena, blood in stool or nausea/emesis.  Patient underwent EGD/colonoscopy 02/25/2019 which demonstrated a partially obstructing necrotic lesion with apple core shape at the hepatic flexure.  Biopsies taken demonstrated moderately differentiated adenocarcinoma.  The mass was tattooed with Mary ink.\par A initial staging CT abdomen/pelvis was performed 2/28/2019 did not show evidence of distant metastasis or regional adenopathy. Known lesion was not commented on in the official report.\par Patient had blood work done at [facility], but not available for review.\par \par 04/09/2019:  Patient is s/p robotic right colectomy 03/25/2019.  He had an uneventful post-operative course.  He reports feeling well and is tolerating diet.  He denies nausea/emesis and is having regular bowel movements.\par Pathology:  T3N0 moderately differentiated adenocarcinoma of colon with focus of poorly differentiated cancer.  Seventeen lymph nodes negative, margins negative.\par \par 07/16/2019: Patient presents for follow up.  He reports recovering well.  Main complaints are constipation and low energy.  He has a good appetite and has had weight gain.  He was evaluated by medical oncologist, Dr. Olga Rodriguez, who recently sent him for CT abdomen/pelvis and PET/CT for which reports are pending because of blood work abnormality.  He offers no other complaints.  Recent hepatic panel is within normal limits.  CEA level not available.\par \par 07/30/2019: Patient presents for discussion of PET/CT results.  Recent CEA was found to be elevated at 7.3 ng/mL.  PET/CT scan shows area of soft tissue nodularity along the right paracolic gutter  with SUV 2.1, most notably a 15 x 5 mm region.  He offers no abdominal complaints.

## 2019-07-30 NOTE — PHYSICAL EXAM
[FreeTextEntry1] : Abdomen: soft, incisions well healed.  No masses palpated. [Normal] : supple, no neck mass and thyroid not enlarged [Normal Neck Lymph Nodes] : normal neck lymph nodes  [Normal Supraclavicular Lymph Nodes] : normal supraclavicular lymph nodes [Normal Groin Lymph Nodes] : normal groin lymph nodes [Normal Axillary Lymph Nodes] : normal axillary lymph nodes [Normal] : grossly intact

## 2019-07-30 NOTE — ASSESSMENT
[FreeTextEntry1] : 74 y/o s/p right colectomy.\par Mildly elevated CEA with nodular omental thickening, low SUV.\par Suspect postoperative inflammation, but recurrent disease is not excluded.\par \par Plan: Would monitor CEA for now.  If continued upward trend, will plan for diagnostic laparoscopy with biopsy.

## 2019-07-30 NOTE — ASSESSMENT
[FreeTextEntry1] : 72 y/o s/p right colectomy.\par Mildly elevated CEA with nodular omental thickening, low SUV.\par Suspect postoperative inflammation, but recurrent disease is not excluded.\par \par Plan: Would monitor CEA for now.  If continued upward trend, will plan for diagnostic laparoscopy with biopsy.

## 2019-08-07 ENCOUNTER — RX CHANGE (OUTPATIENT)
Age: 74
End: 2019-08-07

## 2019-08-07 RX ORDER — CIPROFLOXACIN HYDROCHLORIDE 500 MG/1
500 TABLET, FILM COATED ORAL TWICE DAILY
Qty: 10 | Refills: 0 | Status: ACTIVE | COMMUNITY
Start: 2019-08-07 | End: 1900-01-01

## 2019-08-07 RX ORDER — METRONIDAZOLE 500 MG/1
500 TABLET ORAL TWICE DAILY
Qty: 10 | Refills: 0 | Status: ACTIVE | COMMUNITY
Start: 2019-08-07 | End: 1900-01-01

## 2019-08-13 ENCOUNTER — APPOINTMENT (OUTPATIENT)
Dept: SURGICAL ONCOLOGY | Facility: CLINIC | Age: 74
End: 2019-08-13

## 2019-09-27 NOTE — H&P PST ADULT - VISION (WITH CORRECTIVE LENSES IF THE PATIENT USUALLY WEARS THEM):
Patient:   ALFREDO CRONIN            MRN: [id number]            FIN: [id number]              Age:   74 years     Sex:  FEMALE     :  46   Associated Diagnoses:   None   Author:   BRIANA BENSON     Physical Examination   VS/Measurements     Vitals between:   10-JUL-2019 06:51:12   TO   2019 06:51:12                   LAST RESULT MINIMUM MAXIMUM  Temperature 36.6 36.6 36.7  Heart Rate 75 67 75  Respiratory Rate 16 16 16  NISBP           145 118 145  NIDBP           70 65 70  NIMBP           95 85 95  SpO2                    96 96 99      Review / Management   Laboratory results:     Labs between:  10-JUL-2019 06:51 to 2019 06:51  CBC:                 WBC  HgB  Hct  Plt  MCV  RDW   2019 (L) 3.5  (L) 8.8  (L) 27.4  152  87.3  14.4   10-JUL-2019 (L) 3.5  (L) 9.1  (L) 28.5  158  87.4  14.3   DIFF:                 Seg  Neutroph//ABS  Lymph//ABS  Mono//ABS  EOS/ABS  2019 NOT APPLICABLE  74 // 2.6 16 // (L) 0.6  7 // (L) 0.2  3 // 0.1  10-JUL-2019 NOT APPLICABLE  71 // 2.5 18 // (L) 0.6  7 // 0.3 4 // 0.1  BMP:                 Na  Cl  BUN  Glu   10-JUL-2019 141  (H) 108  9  (H) 156                              K  CO2  Cr  Ca                              (L) 3.3  26  0.70  (L) 8.0   Other Chem:             Mg  Phos  Triglycerides  GGTP  DirectBili                                      POC GLU:                 Latest Result  Latest Date  Minimum  Min Date  Maximum  Max Date                             (H) 147  10-JUL-2019 (H) 147  10-JUL-2019 (H) 121  10-JUL-2019                 .    Subjective:  pt stable, no acute events, pt denies sob/cp/numbness/tingling, pain well controlled  Objective:  VS: WNL, Afebrile   GEN: patient alert and oriented, no acute distress   CV: pedal pulses 2+   PULM: respirations are non-labored   MS: C spine with soft collar on SCD's on bilat LE, DF/PF/EHL intact BLE, SILT BLE, compartments soft, non-tender BLE, dressing c/d/i, no erythema,  swelling or drainage  Neuro: motor and sensory nerve intact C4-8  PSYCH: appropriate, cooperative with exam   Assessment/Plan:   74-year-old female s/p C3-6 decompressive laminectomy on  6/8/2019 for spinal epidural abscess and 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  c collar as needed  advance activity as tolerated, up with assistance  mulimodal pain control  PT/OT  dispo: d/c planning  DVT prophylaxis: Lovenox.  f/u outpatient with Dr. Boyd 7/26  Julian Peña MD  Orthopedic Surgery PGY1  phone [phone]  Agree with abve  Plqn: naima mgm,t precautions, PT/OT, d/c plan to 6W  Ortho f/u 7/26/19  ?s answered, instructions given   wears reading glasses

## 2019-10-08 ENCOUNTER — APPOINTMENT (OUTPATIENT)
Dept: SURGICAL ONCOLOGY | Facility: CLINIC | Age: 74
End: 2019-10-08
Payer: MEDICARE

## 2019-10-08 VITALS
DIASTOLIC BLOOD PRESSURE: 77 MMHG | OXYGEN SATURATION: 100 % | RESPIRATION RATE: 18 BRPM | WEIGHT: 112 LBS | SYSTOLIC BLOOD PRESSURE: 122 MMHG | TEMPERATURE: 97.7 F | BODY MASS INDEX: 18.35 KG/M2 | HEART RATE: 72 BPM

## 2019-10-08 DIAGNOSIS — C18.3 MALIGNANT NEOPLASM OF HEPATIC FLEXURE: ICD-10-CM

## 2019-10-08 PROCEDURE — 99213 OFFICE O/P EST LOW 20 MIN: CPT

## 2019-10-10 PROBLEM — C18.3 MALIGNANT NEOPLASM OF HEPATIC FLEXURE: Status: ACTIVE | Noted: 2019-03-12

## 2019-10-10 NOTE — ASSESSMENT
[FreeTextEntry1] : Doing well.\par No evidence of recurrent/metastatic colon cancer.\par \par Plan: Follow up in 6 months.  Continue medical oncology follow up with Dr. Olga Rodriguez.

## 2019-10-10 NOTE — HISTORY OF PRESENT ILLNESS
[de-identified] : Mr. Lawrence is a 72 y/o male who presented with one month history of RLQ abdominal pain.  He denies weight loss, fever, melena, blood in stool or nausea/emesis.  Patient underwent EGD/colonoscopy 02/25/2019 which demonstrated a partially obstructing necrotic lesion with apple core shape at the hepatic flexure.  Biopsies taken demonstrated moderately differentiated adenocarcinoma.  The mass was tattooed with Mary ink.\par A initial staging CT abdomen/pelvis was performed 2/28/2019 did not show evidence of distant metastasis or regional adenopathy. Known lesion was not commented on in the official report.\par Patient had blood work done at [facility], but not available for review.\par \par 04/09/2019:  Patient is s/p robotic right colectomy 03/25/2019.  He had an uneventful post-operative course.  He reports feeling well and is tolerating diet.  He denies nausea/emesis and is having regular bowel movements.\par Pathology:  T3N0 moderately differentiated adenocarcinoma of colon with focus of poorly differentiated cancer.  Seventeen lymph nodes negative, margins negative.\par \par 07/16/2019: Patient presents for follow up.  He reports recovering well.  Main complaints are constipation and low energy.  He has a good appetite and has had weight gain.  He was evaluated by medical oncologist, Dr. Olga Rodriguez, who recently sent him for CT abdomen/pelvis and PET/CT for which reports are pending because of blood work abnormality.  He offers no other complaints.  Recent hepatic panel is within normal limits.  CEA level not available.\par \par 07/30/2019: Patient presents for discussion of PET/CT results.  Recent CEA was found to be elevated at 7.3 ng/mL.  PET/CT scan shows area of soft tissue nodularity along the right paracolic gutter  with SUV 2.1, most notably a 15 x 5 mm region.  He offers no abdominal complaints.\par \par 10/08/2019: Patient reports doing well.  He is tolerating diet without nausea/emesis or weight loss.  His bowel movements are normal.  Recent blood work shows normalized CEA and repeat CT scan shows improvement of right abdominal nodularity consistent with post-operative changes.

## 2019-11-19 ENCOUNTER — APPOINTMENT (OUTPATIENT)
Dept: SURGICAL ONCOLOGY | Facility: CLINIC | Age: 74
End: 2019-11-19

## 2022-04-04 NOTE — PHYSICAL THERAPY INITIAL EVALUATION ADULT - PLANNED THERAPY INTERVENTIONS, PT EVAL
Date: 4/4/2022    Time of Call: 4:25 PM     Diagnosis:  Severe aortic stenosis     [ TORB ] Ordering provider: Jorge Phillips MD  Order:   TAVR CT  CBC, CMP, INR  Case request and pre-procedure orders for Cors/RHC.     Order received by: Sammi Gallardo RN     Follow-up/additional notes:   New Valve referral to valve clinic from Dr. Ulloa.  Arun was called and the program was explained to him and contact information was shared.     The above will be ordered and scheduled.          stair training/neuromuscular re-education/strengthening/transfer training/balance training/bed mobility training

## 2023-03-17 NOTE — ASU PREOP CHECKLIST - SITE MARKED BY ANESTHESIOLOGIST
March 17, 2023       Romina Salcido YOB: 2009   [address] Date of Visit:  3/17/2023       To Whom It May Concern:    Romina Salcido was seen in my clinic on 3/17/2023. Please excuse his mother Lambert Rosado from work today. If you have any questions or concerns, please don't hesitate to call.     Sincerely,        Meredith Alejandra, KAREN - CNP n/a

## 2024-02-15 ENCOUNTER — INPATIENT (INPATIENT)
Facility: HOSPITAL | Age: 79
LOS: 5 days | Discharge: HOME | End: 2024-02-21
Attending: INTERNAL MEDICINE | Admitting: INTERNAL MEDICINE
Payer: MEDICARE

## 2024-02-15 VITALS
HEART RATE: 97 BPM | RESPIRATION RATE: 18 BRPM | SYSTOLIC BLOOD PRESSURE: 106 MMHG | DIASTOLIC BLOOD PRESSURE: 71 MMHG | TEMPERATURE: 98 F | OXYGEN SATURATION: 94 %

## 2024-02-15 DIAGNOSIS — G93.9 DISORDER OF BRAIN, UNSPECIFIED: ICD-10-CM

## 2024-02-15 DIAGNOSIS — J18.9 PNEUMONIA, UNSPECIFIED ORGANISM: ICD-10-CM

## 2024-02-15 DIAGNOSIS — C18.9 MALIGNANT NEOPLASM OF COLON, UNSPECIFIED: ICD-10-CM

## 2024-02-15 DIAGNOSIS — F03.90 UNSPECIFIED DEMENTIA WITHOUT BEHAVIORAL DISTURBANCE: ICD-10-CM

## 2024-02-15 DIAGNOSIS — Z98.890 OTHER SPECIFIED POSTPROCEDURAL STATES: Chronic | ICD-10-CM

## 2024-02-15 DIAGNOSIS — A41.9 SEPSIS, UNSPECIFIED ORGANISM: ICD-10-CM

## 2024-02-15 DIAGNOSIS — Z98.49 CATARACT EXTRACTION STATUS, UNSPECIFIED EYE: Chronic | ICD-10-CM

## 2024-02-15 DIAGNOSIS — Z29.9 ENCOUNTER FOR PROPHYLACTIC MEASURES, UNSPECIFIED: ICD-10-CM

## 2024-02-15 DIAGNOSIS — G93.40 ENCEPHALOPATHY, UNSPECIFIED: ICD-10-CM

## 2024-02-15 DIAGNOSIS — E78.5 HYPERLIPIDEMIA, UNSPECIFIED: ICD-10-CM

## 2024-02-15 LAB
ADD ON TEST-SPECIMEN IN LAB: SIGNIFICANT CHANGE UP
ALBUMIN SERPL ELPH-MCNC: 3.5 G/DL — SIGNIFICANT CHANGE UP (ref 3.3–5)
ALP SERPL-CCNC: 128 U/L — HIGH (ref 40–120)
ALT FLD-CCNC: 37 U/L — SIGNIFICANT CHANGE UP (ref 4–41)
ANION GAP SERPL CALC-SCNC: 15 MMOL/L — HIGH (ref 7–14)
ANISOCYTOSIS BLD QL: SLIGHT — SIGNIFICANT CHANGE UP
APPEARANCE UR: CLEAR — SIGNIFICANT CHANGE UP
APTT BLD: 28.9 SEC — SIGNIFICANT CHANGE UP (ref 24.5–35.6)
AST SERPL-CCNC: 49 U/L — HIGH (ref 4–40)
BACTERIA # UR AUTO: NEGATIVE /HPF — SIGNIFICANT CHANGE UP
BASE EXCESS BLDV CALC-SCNC: 1.9 MMOL/L — SIGNIFICANT CHANGE UP (ref -2–3)
BASE EXCESS BLDV CALC-SCNC: 3 MMOL/L — SIGNIFICANT CHANGE UP (ref -2–3)
BASOPHILS # BLD AUTO: 0 K/UL — SIGNIFICANT CHANGE UP (ref 0–0.2)
BASOPHILS NFR BLD AUTO: 0 % — SIGNIFICANT CHANGE UP (ref 0–2)
BILIRUB SERPL-MCNC: 1.2 MG/DL — SIGNIFICANT CHANGE UP (ref 0.2–1.2)
BILIRUB UR-MCNC: ABNORMAL
BLOOD GAS VENOUS COMPREHENSIVE RESULT: SIGNIFICANT CHANGE UP
BLOOD GAS VENOUS COMPREHENSIVE RESULT: SIGNIFICANT CHANGE UP
BUN SERPL-MCNC: 27 MG/DL — HIGH (ref 7–23)
CALCIUM SERPL-MCNC: 9.2 MG/DL — SIGNIFICANT CHANGE UP (ref 8.4–10.5)
CAST: 17 /LPF — HIGH (ref 0–4)
CHLORIDE BLDV-SCNC: 101 MMOL/L — SIGNIFICANT CHANGE UP (ref 96–108)
CHLORIDE BLDV-SCNC: 98 MMOL/L — SIGNIFICANT CHANGE UP (ref 96–108)
CHLORIDE SERPL-SCNC: 97 MMOL/L — LOW (ref 98–107)
CO2 BLDV-SCNC: 29.2 MMOL/L — HIGH (ref 22–26)
CO2 BLDV-SCNC: 29.8 MMOL/L — HIGH (ref 22–26)
CO2 SERPL-SCNC: 26 MMOL/L — SIGNIFICANT CHANGE UP (ref 22–31)
COLOR SPEC: ABNORMAL
CREAT SERPL-MCNC: 0.87 MG/DL — SIGNIFICANT CHANGE UP (ref 0.5–1.3)
DIFF PNL FLD: ABNORMAL
EGFR: 89 ML/MIN/1.73M2 — SIGNIFICANT CHANGE UP
EOSINOPHIL # BLD AUTO: 0 K/UL — SIGNIFICANT CHANGE UP (ref 0–0.5)
EOSINOPHIL NFR BLD AUTO: 0 % — SIGNIFICANT CHANGE UP (ref 0–6)
GAS PNL BLDV: 131 MMOL/L — LOW (ref 136–145)
GAS PNL BLDV: 132 MMOL/L — LOW (ref 136–145)
GLUCOSE BLDV-MCNC: 114 MG/DL — HIGH (ref 70–99)
GLUCOSE BLDV-MCNC: 182 MG/DL — HIGH (ref 70–99)
GLUCOSE SERPL-MCNC: 123 MG/DL — HIGH (ref 70–99)
GLUCOSE UR QL: 100 MG/DL
HCO3 BLDV-SCNC: 28 MMOL/L — SIGNIFICANT CHANGE UP (ref 22–29)
HCO3 BLDV-SCNC: 28 MMOL/L — SIGNIFICANT CHANGE UP (ref 22–29)
HCT VFR BLD CALC: 36.6 % — LOW (ref 39–50)
HCT VFR BLDA CALC: 32 % — LOW (ref 39–51)
HCT VFR BLDA CALC: 38 % — LOW (ref 39–51)
HGB BLD CALC-MCNC: 10.8 G/DL — LOW (ref 12.6–17.4)
HGB BLD CALC-MCNC: 12.8 G/DL — SIGNIFICANT CHANGE UP (ref 12.6–17.4)
HGB BLD-MCNC: 12.2 G/DL — LOW (ref 13–17)
HYALINE CASTS # UR AUTO: PRESENT
HYPOCHROMIA BLD QL: SLIGHT — SIGNIFICANT CHANGE UP
IANC: 9.33 K/UL — HIGH (ref 1.8–7.4)
INR BLD: 1.1 RATIO — SIGNIFICANT CHANGE UP (ref 0.85–1.18)
KETONES UR-MCNC: ABNORMAL MG/DL
LACTATE BLDV-MCNC: 1.7 MMOL/L — SIGNIFICANT CHANGE UP (ref 0.5–2)
LACTATE BLDV-MCNC: 2.4 MMOL/L — HIGH (ref 0.5–2)
LEUKOCYTE ESTERASE UR-ACNC: ABNORMAL
LIDOCAIN IGE QN: 12 U/L — SIGNIFICANT CHANGE UP (ref 7–60)
LYMPHOCYTES # BLD AUTO: 0.36 K/UL — LOW (ref 1–3.3)
LYMPHOCYTES # BLD AUTO: 3.5 % — LOW (ref 13–44)
MACROCYTES BLD QL: SLIGHT — SIGNIFICANT CHANGE UP
MCHC RBC-ENTMCNC: 30 PG — SIGNIFICANT CHANGE UP (ref 27–34)
MCHC RBC-ENTMCNC: 33.3 GM/DL — SIGNIFICANT CHANGE UP (ref 32–36)
MCV RBC AUTO: 90.1 FL — SIGNIFICANT CHANGE UP (ref 80–100)
MONOCYTES # BLD AUTO: 0.18 K/UL — SIGNIFICANT CHANGE UP (ref 0–0.9)
MONOCYTES NFR BLD AUTO: 1.7 % — LOW (ref 2–14)
NEUTROPHILS # BLD AUTO: 9.88 K/UL — HIGH (ref 1.8–7.4)
NEUTROPHILS NFR BLD AUTO: 85.2 % — HIGH (ref 43–77)
NEUTS BAND # BLD: 9.6 % — HIGH (ref 0–6)
NITRITE UR-MCNC: NEGATIVE — SIGNIFICANT CHANGE UP
OVALOCYTES BLD QL SMEAR: SLIGHT — SIGNIFICANT CHANGE UP
PCO2 BLDV: 43 MMHG — SIGNIFICANT CHANGE UP (ref 42–55)
PCO2 BLDV: 51 MMHG — SIGNIFICANT CHANGE UP (ref 42–55)
PH BLDV: 7.35 — SIGNIFICANT CHANGE UP (ref 7.32–7.43)
PH BLDV: 7.42 — SIGNIFICANT CHANGE UP (ref 7.32–7.43)
PH UR: 6 — SIGNIFICANT CHANGE UP (ref 5–8)
PLAT MORPH BLD: NORMAL — SIGNIFICANT CHANGE UP
PLATELET # BLD AUTO: 149 K/UL — LOW (ref 150–400)
PLATELET COUNT - ESTIMATE: NORMAL — SIGNIFICANT CHANGE UP
PO2 BLDV: 50 MMHG — HIGH (ref 25–45)
PO2 BLDV: 54 MMHG — HIGH (ref 25–45)
POLYCHROMASIA BLD QL SMEAR: SLIGHT — SIGNIFICANT CHANGE UP
POTASSIUM BLDV-SCNC: 3.7 MMOL/L — SIGNIFICANT CHANGE UP (ref 3.5–5.1)
POTASSIUM BLDV-SCNC: 5.3 MMOL/L — HIGH (ref 3.5–5.1)
POTASSIUM SERPL-MCNC: 3.7 MMOL/L — SIGNIFICANT CHANGE UP (ref 3.5–5.3)
POTASSIUM SERPL-SCNC: 3.7 MMOL/L — SIGNIFICANT CHANGE UP (ref 3.5–5.3)
PROT SERPL-MCNC: 7.9 G/DL — SIGNIFICANT CHANGE UP (ref 6–8.3)
PROT UR-MCNC: 300 MG/DL
PROTHROM AB SERPL-ACNC: 12.4 SEC — SIGNIFICANT CHANGE UP (ref 9.5–13)
RBC # BLD: 4.06 M/UL — LOW (ref 4.2–5.8)
RBC # FLD: 12.8 % — SIGNIFICANT CHANGE UP (ref 10.3–14.5)
RBC BLD AUTO: ABNORMAL
RBC CASTS # UR COMP ASSIST: 5 /HPF — HIGH (ref 0–4)
REVIEW: SIGNIFICANT CHANGE UP
SAO2 % BLDV: 82.5 % — SIGNIFICANT CHANGE UP (ref 67–88)
SAO2 % BLDV: 84.5 % — SIGNIFICANT CHANGE UP (ref 67–88)
SODIUM SERPL-SCNC: 138 MMOL/L — SIGNIFICANT CHANGE UP (ref 135–145)
SP GR SPEC: 1.02 — SIGNIFICANT CHANGE UP (ref 1–1.03)
SQUAMOUS # UR AUTO: 11 /HPF — HIGH (ref 0–5)
TSH SERPL-MCNC: 1.16 UIU/ML — SIGNIFICANT CHANGE UP (ref 0.27–4.2)
UROBILINOGEN FLD QL: 1 MG/DL — SIGNIFICANT CHANGE UP (ref 0.2–1)
WBC # BLD: 10.42 K/UL — SIGNIFICANT CHANGE UP (ref 3.8–10.5)
WBC # FLD AUTO: 10.42 K/UL — SIGNIFICANT CHANGE UP (ref 3.8–10.5)
WBC UR QL: 5 /HPF — SIGNIFICANT CHANGE UP (ref 0–5)

## 2024-02-15 PROCEDURE — 74177 CT ABD & PELVIS W/CONTRAST: CPT | Mod: 26,MA

## 2024-02-15 PROCEDURE — 99284 EMERGENCY DEPT VISIT MOD MDM: CPT

## 2024-02-15 PROCEDURE — 99223 1ST HOSP IP/OBS HIGH 75: CPT | Mod: GC

## 2024-02-15 PROCEDURE — 71275 CT ANGIOGRAPHY CHEST: CPT | Mod: 26,MA

## 2024-02-15 PROCEDURE — 99285 EMERGENCY DEPT VISIT HI MDM: CPT

## 2024-02-15 PROCEDURE — 70450 CT HEAD/BRAIN W/O DYE: CPT | Mod: 26,MA

## 2024-02-15 RX ORDER — IPRATROPIUM/ALBUTEROL SULFATE 18-103MCG
3 AEROSOL WITH ADAPTER (GRAM) INHALATION EVERY 6 HOURS
Refills: 0 | Status: DISCONTINUED | OUTPATIENT
Start: 2024-02-15 | End: 2024-02-21

## 2024-02-15 RX ORDER — SODIUM CHLORIDE 9 MG/ML
1000 INJECTION INTRAMUSCULAR; INTRAVENOUS; SUBCUTANEOUS ONCE
Refills: 0 | Status: COMPLETED | OUTPATIENT
Start: 2024-02-15 | End: 2024-02-15

## 2024-02-15 RX ORDER — AZITHROMYCIN 500 MG/1
500 TABLET, FILM COATED ORAL ONCE
Refills: 0 | Status: COMPLETED | OUTPATIENT
Start: 2024-02-16 | End: 2024-02-16

## 2024-02-15 RX ORDER — ATORVASTATIN CALCIUM 80 MG/1
20 TABLET, FILM COATED ORAL AT BEDTIME
Refills: 0 | Status: DISCONTINUED | OUTPATIENT
Start: 2024-02-15 | End: 2024-02-21

## 2024-02-15 RX ORDER — ACETAMINOPHEN 500 MG
650 TABLET ORAL EVERY 6 HOURS
Refills: 0 | Status: DISCONTINUED | OUTPATIENT
Start: 2024-02-15 | End: 2024-02-21

## 2024-02-15 RX ORDER — CEFTRIAXONE 500 MG/1
1000 INJECTION, POWDER, FOR SOLUTION INTRAMUSCULAR; INTRAVENOUS ONCE
Refills: 0 | Status: COMPLETED | OUTPATIENT
Start: 2024-02-15 | End: 2024-02-15

## 2024-02-15 RX ORDER — CEFTRIAXONE 500 MG/1
1000 INJECTION, POWDER, FOR SOLUTION INTRAMUSCULAR; INTRAVENOUS EVERY 24 HOURS
Refills: 0 | Status: COMPLETED | OUTPATIENT
Start: 2024-02-16 | End: 2024-02-19

## 2024-02-15 RX ORDER — DEXAMETHASONE 0.5 MG/5ML
4 ELIXIR ORAL ONCE
Refills: 0 | Status: COMPLETED | OUTPATIENT
Start: 2024-02-15 | End: 2024-02-15

## 2024-02-15 RX ORDER — ROSUVASTATIN CALCIUM 5 MG/1
1 TABLET ORAL
Refills: 0 | DISCHARGE

## 2024-02-15 RX ORDER — AZITHROMYCIN 500 MG/1
500 TABLET, FILM COATED ORAL ONCE
Refills: 0 | Status: COMPLETED | OUTPATIENT
Start: 2024-02-15 | End: 2024-02-15

## 2024-02-15 RX ORDER — DEXAMETHASONE 0.5 MG/5ML
4 ELIXIR ORAL EVERY 6 HOURS
Refills: 0 | Status: DISCONTINUED | OUTPATIENT
Start: 2024-02-15 | End: 2024-02-17

## 2024-02-15 RX ADMIN — CEFTRIAXONE 100 MILLIGRAM(S): 500 INJECTION, POWDER, FOR SOLUTION INTRAMUSCULAR; INTRAVENOUS at 16:00

## 2024-02-15 RX ADMIN — ATORVASTATIN CALCIUM 20 MILLIGRAM(S): 80 TABLET, FILM COATED ORAL at 21:38

## 2024-02-15 RX ADMIN — Medication 4 MILLIGRAM(S): at 16:00

## 2024-02-15 RX ADMIN — AZITHROMYCIN 255 MILLIGRAM(S): 500 TABLET, FILM COATED ORAL at 16:00

## 2024-02-15 RX ADMIN — SODIUM CHLORIDE 1000 MILLILITER(S): 9 INJECTION INTRAMUSCULAR; INTRAVENOUS; SUBCUTANEOUS at 15:47

## 2024-02-15 RX ADMIN — Medication 4 MILLIGRAM(S): at 21:38

## 2024-02-15 NOTE — CONSULT NOTE ADULT - ATTENDING COMMENTS
Small cerebellar lesion, possible cav mal, unrelated to presenting symptoms. Needs MRI brain w/wo to further characterize.

## 2024-02-15 NOTE — ED PROVIDER NOTE - PHYSICAL EXAMINATION
PHYSICAL EXAM:  GENERAL: Sitting comfortable in bed, in no acute distress  HENMT: Atraumatic, moist mucous membranes  EYES: Clear bilaterally, PERRL, EOMs intact b/l  HEART: Regular rate and regular rhythm  RESPIRATORY: Clear to auscultation bilaterally, no wheezes/rhonchi/rales  ABDOMEN: Soft, mild RUQ ttp, negative Craft's, hepatomegaly, nondistended  EXTREMITIES: No lower extremity edema  NEURO: Alert, follows commands, no focal motor or sensory deficits   SKIN: Skin normal color for race, warm, dry and intact

## 2024-02-15 NOTE — ED PROVIDER NOTE - CLINICAL SUMMARY MEDICAL DECISION MAKING FREE TEXT BOX
78 male past medical history dementia, hyperlipidemia, possible COPD, colon cancer status post colectomy presenting with altered mental status. DDx includes but not limited to: infectious vs metastatic disease. Plan: blood work, UA, CTA chest, CT A/P, CTH. Will re-assess.

## 2024-02-15 NOTE — H&P ADULT - NSHPREVIEWOFSYSTEMS_GEN_ALL_CORE
REVIEW OF SYSTEMS:  CONSTITUTIONAL: some chills  EYES: No visual disturbances or eye pain  ENMT: No sinus or throat pain  RESPIRATORY: Chronic cough with worsening  CARDIOVASCULAR: No chest pain or dizziness  GASTROINTESTINAL: No abdominal or epigastric pain. No diarrhea or constipation.  GENITOURINARY: No dysuria or hematuria  NEUROLOGICAL: No headaches, loss of strength or numbness  MUSCULOSKELETAL: No joint pain or swelling;

## 2024-02-15 NOTE — ED ADULT NURSE NOTE - NSFALLUNIVINTERV_ED_ALL_ED
Bed/Stretcher in lowest position, wheels locked, appropriate side rails in place/Call bell, personal items and telephone in reach/Instruct patient to call for assistance before getting out of bed/chair/stretcher/Non-slip footwear applied when patient is off stretcher/[place] to call system/Physically safe environment - no spills, clutter or unnecessary equipment/Purposeful proactive rounding/Room/bathroom lighting operational, light cord in reach

## 2024-02-15 NOTE — H&P ADULT - HISTORY OF PRESENT ILLNESS
77 yo M with history of dementia, colon cancer s/p colectomy in 2019, hyperlipidemia, ?COPD presenting with confusion and visual hallucinations for past 2 days. Per daughter at bedside, altagracia has increased confusion for past two days along with visual hallucinations of seeing bugs in the wall. patient used to take levodopa, but has not taken in past year. Wife gave him some last night, and it made his symptoms worse. Patient also endorsing RUQ pain for 2 days that has improved. Additionally, some baseline SOB for patient along with chronic cough, but has been more SOB since yesterday. Patient also with chills.    In ED, CT head revealed nonspecific 1.1 x 0.9cm hyperdensity in R cerebellum which may represent hemorrhagic lesion vs calcified mass/cavernoma with rec for MRI brain and starting on dex 4mg q6h. CT of chest with multifocal pneumonia, CTAP with no evidence of bowel obstruction. Received 1 dose of CTX and azithro as well as 1L NS.   77 yo M with history of dementia, colon cancer s/p colectomy in 2019, hyperlipidemia, ?ILD presenting with confusion and visual hallucinations for past 2 days. Per daughter at bedside, altagracia has increased confusion for past two days along with visual hallucinations of seeing bugs in the wall. patient used to take levodopa, but has not taken in past year. Wife gave him some last night, and it made his symptoms worse. Patient also endorsing RUQ pain for 2 days that has improved. Additionally, some baseline SOB for patient along with chronic cough, but has been more SOB since yesterday. Patient also with chills, but no recorded fever.     In ED, CT head revealed nonspecific 1.1 x 0.9cm hyperdensity in R cerebellum which may represent hemorrhagic lesion vs calcified mass/cavernoma with rec for MRI brain and starting on dex 4mg q6h. CT of chest with multifocal pneumonia, CTAP with no evidence of bowel obstruction. Received 1 dose of CTX and azithro as well as 1L NS.

## 2024-02-15 NOTE — ED PROVIDER NOTE - CADM POA CENTRAL LINE
Patient: Yuri Sanchez    Procedure Summary     Date:  20 Room / Location:  [facility] OR  / LABOR AND DELIVERY    Anesthesia Start:   Anesthesia Stop:  04/10/20 0130    Procedure:   SECTION, PRIMARY (N/A Abdomen) Diagnosis:  (Primary  section due to Fetal intolerance to labor, delivered)    Surgeon:  Sulma Hernandez M.D. Responsible Provider:  Cornelius Pineda M.D.    Anesthesia Type:  epidural ASA Status:  2          Final Anesthesia Type: epidural  Last vitals  BP   Blood Pressure: 110/53    Temp   36.9 °C (98.4 °F)    Pulse   Pulse: (!) 101   Resp   18    SpO2   97 %      Anesthesia Post Evaluation    Patient location during evaluation: PACU  Patient participation: complete - patient participated  Level of consciousness: awake and alert  Pain score: 0    Airway patency: patent  Anesthetic complications: no  Cardiovascular status: hemodynamically stable  Respiratory status: acceptable  Hydration status: euvolemic    PONV: none                   
No

## 2024-02-15 NOTE — ED PROVIDER NOTE - ATTENDING CONTRIBUTION TO CARE
78 male past medical history presumed parkinsons dementia (disputed; currently on no tx), hyperlipidemia, possible COPD, colon cancer status post colectomy presenting with altered mental status. increased confusion visual hallucinations seeing bugs on the wall. decreased PO with abd discomfort hepatic fullness ? mass SOB and cough   Plan  CT CAP to eval for mass; head as w/u of confusion  labs LFTs lytes creatinine   IVFs  metabolic w/u b12 folate tsh   TBA given decompensatioin

## 2024-02-15 NOTE — CONSULT NOTE ADULT - ASSESSMENT
77yo male PMHx ?dementia, hyperlipidemia, possible COPD, colon cancer status post colectomy in 2019, presenting with daughter who states patient has had visual hallucinations for the past 2 days. Last night he was having visual hallucinations seeing bugs on the wall and saying he was in a speeding car. Patient used to take levodopa for ?parkinsons but has not taken it for the past year as his doctor told him to stop. His wife gave him some last night to see if it would help with his mental status, but it made his symptoms worse. Daughter states he is usually able to care for himself fully and still drives. Neurosurgery consulted for CTH showing right cerebellar hyperdensity.

## 2024-02-15 NOTE — H&P ADULT - ATTENDING COMMENTS
79 yo M with pmhx of possible dementia, colon cancer s/p colectomy in 2019 (in remission), hyperlipidemia, ?ILD presenting with 2d of visual hallucinations. pt reports seeing bugs crawling on the wall. also noted with intermittent confusion. denies fall/head trauma, denies fever/chills, reports cough and uri symptoms prior to that.     #Severe sepsis: meets severe sepsis criteria given bandemia, tachycardia, elev lactate. AMS 2/2 multifocal PNA. CT Chest with multifocal PNA.  #CAP  - cont with ceftriaxone and azithromycin  - fu blood, urine culture, urine legionella/strep pneu, MRSA/MSSA  - RVP-pending   - monitor pulse ox, vitals, trend wbc    #Brain lesion  CT Head showed hyperdense lesion in the cerebellem  - evaluated by nsgy, recommends dexamethasone 4mg q6hr  - fu MRI Brain     rest as above   dvt: scd, hold for now 79 yo M with pmhx of possible dementia, colon cancer s/p colectomy in 2019 (in remission), hyperlipidemia, ILD? presenting with 2d of visual hallucinations. pt reports seeing bugs crawling on the wall. also noted with intermittent confusion. denies fall/head trauma, denies fever/chills, reports cough and uri symptoms prior to that. Denies chest pain.     afeb, tachycardia, satting well on ra. exam with NAD, lungs clear without crackles or wheezes. abd soft, non-tender. labs with no leukocytosis but with bandemia, elev. lactate. CTA chest without PE, shows multifocal pna.     #Severe sepsis: meets severe sepsis criteria given bandemia, tachycardia, elev lactate. AMS 2/2 multifocal PNA. CT Chest with Bilateral patchy   ground-glass and consolidative opacities involving all 5 lobes, compatible with multiple pneumonia. Diffuse bronchial wall thickening.  #CAP  - cont with ceftriaxone and azithromycin  - fu blood, urine culture, urine legionella/strep pneu, MRSA/MSSA  - RVP negative  - monitor pulse ox, vitals, trend wbc    #Brain lesion  CT Head showed hyperdense lesion in the cerebellum  - evaluated by nsgy, recommends dexamethasone 4mg q6hr  - fu MRI Brain     rest as above   dvt: scd, hold for now

## 2024-02-15 NOTE — H&P ADULT - TIME BILLING
Time-based billing (NON-critical care).     [75  ] minutes spent on total encounter; more than 50% of the visit was spent counseling and / or coordinating care by the attending physician.  The necessity of the time spent during the encounter on this date of service was due to:     documentation in [place], reviewing chart and coordinating care with patient/residents and interdisciplinary staff (such as , social workers, etc) as well as reviewing vitals, laboratory data, radiology, medication list, consultants' recommendations and prior records. Interventions were performed as documented above.

## 2024-02-15 NOTE — H&P ADULT - PROBLEM SELECTOR PLAN 5
hx resection in 2019 hx resection in 2019, in remission, no chemotherapy or radiation. no longer follows with oncologist.

## 2024-02-15 NOTE — H&P ADULT - PROBLEM SELECTOR PLAN 1
WBC 10.4 but with 9.6% bands, HR >90, lactate 2.4  multifocal PNA on CT chest, with increased SOB  UA with no bacteria, 11 epithelial cells  - treatment of PNA as below  - f/u Bcx2 (drawn 2 hours after abx)  - f/u Ucx  - RVP WBC 10.4 but with 9.6% bands, HR >90, lactate 2.4, now normalized to 1  multifocal PNA on CT chest, with increased SOB  UA with no bacteria, 11 epithelial cells  - treatment of PNA as below  - f/u Bcx2 (drawn 2 hours after abx)  - f/u Ucx  - RVP

## 2024-02-15 NOTE — H&P ADULT - PROBLEM SELECTOR PLAN 2
Increased SOB for past few days, with CT findings of multifocal PNA  - CTX  - azithro 500 x3d  - urine legionella  - urine strep  - sputum culture Increased SOB for past few days, with CT findings of multifocal PNA  - empiric CTX x5d  - azithro 500 x3d  - urine legionella  - urine strep  - sputum culture  - duonebs prn

## 2024-02-15 NOTE — CONSULT NOTE ADULT - SUBJECTIVE AND OBJECTIVE BOX
NEUROSURGERY CONSULT    HPI: 79yo male PMHx ?dementia, hyperlipidemia, possible COPD, colon cancer status post colectomy in 2019, presenting with daughter who states patient has had visual hallucinations for the past 2 days. Last night he was having visual hallucinations seeing bugs on the wall and saying he was in a speeding car. Patient used to take levodopa for ?parkinsons but has not taken it for the past year as his doctor told him to stop. His wife gave him some last night to see if it would help with his mental status, but it made his symptoms worse. Daughter states he is usually able to care for himself fully and still drives. Neurosurgery consulted for CTH showing right cerebellar hyperdensity.     RADIOLOGY:     < from: CT Head No Cont (02.15.24 @ 14:45) >  FINDINGS:  There is mild diffuse parenchymal volume loss.    There are areas of low attenuation in the periventricular white matter   likely related to mild chronic microvascular ischemic changes.    Nonspecific 1.1 x 0.9 cm hyperdensity noted in the right cerebellum with   surrounding hypodensity which may represent edema.    There is no midline shift. There is no acute territorial infarct. There   is no hydrocephalus.    The cranium is intact.    Mucosal thickening paranasal sinuses      IMPRESSION:  Nonspecific 1.1 x 0.9 cm hyperdensity in the right cerebellum which may   represent hyperdense/hemorrhagic lesion versus calcified mass/cavernoma.   MRI of the brain with contrast recommended for further evaluation.    Vital Signs Last 24 Hrs  T(C): 36.4 (15 Feb 2024 12:06), Max: 36.4 (15 Feb 2024 12:06)  T(F): 97.5 (15 Feb 2024 12:06), Max: 97.5 (15 Feb 2024 12:06)  HR: 97 (15 Feb 2024 12:06) (97 - 97)  BP: 106/71 (15 Feb 2024 12:06) (106/71 - 106/71)  BP(mean): --  RR: 18 (15 Feb 2024 12:06) (18 - 18)  SpO2: 94% (15 Feb 2024 12:06) (94% - 94%)    Parameters below as of 15 Feb 2024 12:06  Patient On (Oxygen Delivery Method): room air        LABS:                        12.2   10.42 )-----------( 149      ( 15 Feb 2024 14:22 )             36.6     02-15    138  |  97<L>  |  27<H>  ----------------------------<  123<H>  3.7   |  26  |  0.87    Ca    9.2      15 Feb 2024 14:22    TPro  7.9  /  Alb  3.5  /  TBili  1.2  /  DBili  x   /  AST  49<H>  /  ALT  37  /  AlkPhos  128<H>  02-15    PT/INR - ( 15 Feb 2024 14:22 )   PT: 12.4 sec;   INR: 1.10 ratio         PTT - ( 15 Feb 2024 14:22 )  PTT:28.9 sec      PHYSICAL EXAM:  AOx3, appropriate, follows commands  PERRL, EOMI, face symmetrical   DUKES x 4 with good strength   Sensation intact to light touch   No pronator drift   Incision C/D/I   No clonus  No hoffmans

## 2024-02-15 NOTE — ED ADULT NURSE REASSESSMENT NOTE - NS ED NURSE REASSESS COMMENT FT1
Report received from VALERIE Jacobo. Patient A&Ox2 primarily Cantonese speaking, daughter at bedside for translation, respirations even and unlabored. Vitals stable. IV line intact and patent. patient offer no complaints at this time. Report given to ESSU 2 VALERIE Aly.

## 2024-02-15 NOTE — H&P ADULT - NSHPLABSRESULTS_GEN_ALL_CORE
LABS:                         12.2   10.42 )-----------( 149      ( 15 Feb 2024 14:22 )             36.6     02-15    138  |  97<L>  |  27<H>  ----------------------------<  123<H>  3.7   |  26  |  0.87    Ca    9.2      15 Feb 2024 14:22    TPro  7.9  /  Alb  3.5  /  TBili  1.2  /  DBili  x   /  AST  49<H>  /  ALT  37  /  AlkPhos  128<H>  0215    PT/INR - ( 15 Feb 2024 14:22 )   PT: 12.4 sec;   INR: 1.10 ratio         PTT - ( 15 Feb 2024 14:22 )  PTT:28.9 sec  Urinalysis Basic - ( 15 Feb 2024 14:22 )    Color: Orange / Appearance: Clear / S.021 / pH: x  Gluc: 123 mg/dL / Ketone: Trace mg/dL  / Bili: Small / Urobili: 1.0 mg/dL   Blood: x / Protein: 300 mg/dL / Nitrite: Negative   Leuk Esterase: Trace / RBC: 5 /HPF / WBC 5 /HPF   Sq Epi: x / Non Sq Epi: 11 /HPF / Bacteria: Negative /HPF            RADIOLOGY, EKG & ADDITIONAL TESTS: Reviewed.     CT head:  IMPRESSION:  Nonspecific 1.1 x 0.9 cm hyperdensity in the right cerebellum which may   represent hyperdense/hemorrhagic lesion versus calcified mass/cavernoma.   MRI of the brain with contrast recommended for further evaluation.        CT chest Angio and AP:  IMPRESSION:  Multiple pneumonia.    No pulmonary embolism.    Limited evaluation of bowel secondary tolack of intraperitoneal fat and   oral contrast. No gross evidence of bowel obstruction.

## 2024-02-15 NOTE — H&P ADULT - PROBLEM SELECTOR PLAN 3
1.1 x 0.9cm hyperdense lesion of brain concerning for hemorrhagic lesion vs. cavernoma  - neurosurgery consulted  - recommend dex 4mg q6h and if hallucinations get worse, can stop  - brain MRI w/wo contrast

## 2024-02-15 NOTE — H&P ADULT - ASSESSMENT
79 yo M with history of dementia, colon cancer s/p colectomy in 2019, hyperlipidemia, ?COPD presenting with confusion and visual hallucinations for past 2 days found to have multifocal pneumonia on CT chest along with hyperdense lesion in CT head concerning for hemorrhagic lesion vs cavernoma.  77 yo M with history of dementia, colon cancer s/p colectomy in 2019, hyperlipidemia, ?ILD presenting with confusion and visual hallucinations for past 2 days found to have multifocal pneumonia on CT chest along with hyperdense lesion in CT head concerning for hemorrhagic lesion vs cavernoma.

## 2024-02-15 NOTE — H&P ADULT - NSHPPHYSICALEXAM_GEN_ALL_CORE
T(C): 36.1 (02-15-24 @ 16:35), Max: 36.4 (02-15-24 @ 12:06)  T(F): 97 (02-15-24 @ 16:35), Max: 97.5 (02-15-24 @ 12:06)  HR: 90 (02-15-24 @ 16:35) (90 - 97)  BP: 110/65 (02-15-24 @ 16:35) (106/71 - 110/65)  ABP: --  ABP(mean): --  RR: 18 (02-15-24 @ 16:35) (18 - 18)  SpO2: 97% (02-15-24 @ 16:35) (94% - 97%) T(C): 36.1 (02-15-24 @ 16:35), Max: 36.4 (02-15-24 @ 12:06)  T(F): 97 (02-15-24 @ 16:35), Max: 97.5 (02-15-24 @ 12:06)  HR: 90 (02-15-24 @ 16:35) (90 - 97)  BP: 110/65 (02-15-24 @ 16:35) (106/71 - 110/65)  ABP: --  ABP(mean): --  RR: 18 (02-15-24 @ 16:35) (18 - 18)  SpO2: 97% (02-15-24 @ 16:35) (94% - 97%)  GENERAL: NAD, lying in bed comfortably  HEAD:  Atraumatic, normocephalic  EYES: EOMI, PERRLA, conjunctiva and sclera clear  NECK: Supple, trachea midline, no JVD  HEART: Regular rate and rhythm, no murmurs, rubs, or gallops  LUNGS:  Clear to auscultation bilaterally, no crackles, wheezing, or rhonchi  ABDOMEN: some TTP in LLQ, +BS  EXTREMITIES:  No clubbing, cyanosis, or edema  NERVOUS SYSTEM:  A&Ox3, moving all extremities, no focal deficits , no dysdiadochokinesia, gait with no abnormalities, normal finger to nose test  SKIN: No rashes or lesions

## 2024-02-15 NOTE — H&P ADULT - PROBLEM SELECTOR PLAN 6
DVT prophy: scds given possible hemorrhagic lesion  Diet: on rosuvastatin 5mg at home  - atorvastatin 20mg at bedtime

## 2024-02-15 NOTE — CONSULT NOTE ADULT - PROBLEM SELECTOR RECOMMENDATION 9
- MRI Brain wwo contrast   - dex 4mg q 6hr -- if hallucinations worsen on dex can stop     [id number]    Case discussed with attending neurosurgeon Dr. Whitley

## 2024-02-15 NOTE — H&P ADULT - PROBLEM SELECTOR PLAN 4
per daughter, patient at baseline still drives and takes care of self per daughter, patient at baseline still drives and takes care of self as recently as last week  - no parkinsons per outpatient neurologist

## 2024-02-15 NOTE — ED ADULT NURSE NOTE - CHIEF COMPLAINT QUOTE
Pt brought in by daughter ambulatory with steady gait c/o altered mental status, increased confusion. Daughter reports patient having visual hallucinations. Family gave Pt Levodopa yesterday that made symptoms worse. Last known normal 2 days ago as per daughter. Daughter also reports recent cough, chills. Pt following commands. PHx Parkinson's Dx

## 2024-02-15 NOTE — ED ADULT NURSE NOTE - OBJECTIVE STATEMENT
Pt is AAOX2. Pt daughter at bedside. Per daughter pt has been more confused over the past few days and has been having hallucinations and seeing things on the wall.

## 2024-02-15 NOTE — ED PROVIDER NOTE - OBJECTIVE STATEMENT
78 male past medical history dementia, hyperlipidemia, possible COPD, colon cancer status post colectomy presenting with altered mental status.  Daughter at bedside for translation and collateral.  Says that 2 days ago patient started having increased confusion.  Last night he was having visual hallucinations seeing bugs on the wall.  Patient used to take levodopa but has not taken it for the past year.  His wife gave him some last night to see if it would help with his mental status, but it made his symptoms worse.  Patient also endorsing right upper abdominal pain for the past 2 days that is feeling a little bit better now.  Has some shortness of breath at baseline as well as a chronic cough but says that he feels more short of breath since yesterday.  Also endorsing chills.  No fever, chest pain, nausea, vomiting, diarrhea, urinary symptoms.

## 2024-02-16 LAB
ALBUMIN SERPL ELPH-MCNC: 3 G/DL — LOW (ref 3.3–5)
ALP SERPL-CCNC: 113 U/L — SIGNIFICANT CHANGE UP (ref 40–120)
ALT FLD-CCNC: 35 U/L — SIGNIFICANT CHANGE UP (ref 4–41)
ANION GAP SERPL CALC-SCNC: 10 MMOL/L — SIGNIFICANT CHANGE UP (ref 7–14)
AST SERPL-CCNC: 32 U/L — SIGNIFICANT CHANGE UP (ref 4–40)
B PERT DNA SPEC QL NAA+PROBE: SIGNIFICANT CHANGE UP
B PERT+PARAPERT DNA PNL SPEC NAA+PROBE: SIGNIFICANT CHANGE UP
BASOPHILS # BLD AUTO: 0.01 K/UL — SIGNIFICANT CHANGE UP (ref 0–0.2)
BASOPHILS NFR BLD AUTO: 0.1 % — SIGNIFICANT CHANGE UP (ref 0–2)
BILIRUB SERPL-MCNC: 0.5 MG/DL — SIGNIFICANT CHANGE UP (ref 0.2–1.2)
BORDETELLA PARAPERTUSSIS (RAPRVP): SIGNIFICANT CHANGE UP
BUN SERPL-MCNC: 28 MG/DL — HIGH (ref 7–23)
C PNEUM DNA SPEC QL NAA+PROBE: SIGNIFICANT CHANGE UP
CALCIUM SERPL-MCNC: 8.6 MG/DL — SIGNIFICANT CHANGE UP (ref 8.4–10.5)
CHLORIDE SERPL-SCNC: 103 MMOL/L — SIGNIFICANT CHANGE UP (ref 98–107)
CO2 SERPL-SCNC: 25 MMOL/L — SIGNIFICANT CHANGE UP (ref 22–31)
CREAT SERPL-MCNC: 0.63 MG/DL — SIGNIFICANT CHANGE UP (ref 0.5–1.3)
CULTURE RESULTS: SIGNIFICANT CHANGE UP
EGFR: 99 ML/MIN/1.73M2 — SIGNIFICANT CHANGE UP
EOSINOPHIL # BLD AUTO: 0 K/UL — SIGNIFICANT CHANGE UP (ref 0–0.5)
EOSINOPHIL NFR BLD AUTO: 0 % — SIGNIFICANT CHANGE UP (ref 0–6)
FLUAV SUBTYP SPEC NAA+PROBE: SIGNIFICANT CHANGE UP
FLUBV RNA SPEC QL NAA+PROBE: SIGNIFICANT CHANGE UP
GLUCOSE SERPL-MCNC: 138 MG/DL — HIGH (ref 70–99)
HADV DNA SPEC QL NAA+PROBE: SIGNIFICANT CHANGE UP
HCOV 229E RNA SPEC QL NAA+PROBE: SIGNIFICANT CHANGE UP
HCOV HKU1 RNA SPEC QL NAA+PROBE: SIGNIFICANT CHANGE UP
HCOV NL63 RNA SPEC QL NAA+PROBE: SIGNIFICANT CHANGE UP
HCOV OC43 RNA SPEC QL NAA+PROBE: SIGNIFICANT CHANGE UP
HCT VFR BLD CALC: 32.2 % — LOW (ref 39–50)
HCV AB S/CO SERPL IA: 0.12 S/CO — SIGNIFICANT CHANGE UP (ref 0–0.99)
HCV AB SERPL-IMP: SIGNIFICANT CHANGE UP
HGB BLD-MCNC: 10.8 G/DL — LOW (ref 13–17)
HMPV RNA SPEC QL NAA+PROBE: SIGNIFICANT CHANGE UP
HPIV1 RNA SPEC QL NAA+PROBE: SIGNIFICANT CHANGE UP
HPIV2 RNA SPEC QL NAA+PROBE: SIGNIFICANT CHANGE UP
HPIV3 RNA SPEC QL NAA+PROBE: SIGNIFICANT CHANGE UP
HPIV4 RNA SPEC QL NAA+PROBE: SIGNIFICANT CHANGE UP
IANC: 8.54 K/UL — HIGH (ref 1.8–7.4)
IMM GRANULOCYTES NFR BLD AUTO: 0.6 % — SIGNIFICANT CHANGE UP (ref 0–0.9)
LYMPHOCYTES # BLD AUTO: 0.28 K/UL — LOW (ref 1–3.3)
LYMPHOCYTES # BLD AUTO: 3.1 % — LOW (ref 13–44)
M PNEUMO DNA SPEC QL NAA+PROBE: SIGNIFICANT CHANGE UP
MAGNESIUM SERPL-MCNC: 2.3 MG/DL — SIGNIFICANT CHANGE UP (ref 1.6–2.6)
MCHC RBC-ENTMCNC: 30.1 PG — SIGNIFICANT CHANGE UP (ref 27–34)
MCHC RBC-ENTMCNC: 33.5 GM/DL — SIGNIFICANT CHANGE UP (ref 32–36)
MCV RBC AUTO: 89.7 FL — SIGNIFICANT CHANGE UP (ref 80–100)
MONOCYTES # BLD AUTO: 0.11 K/UL — SIGNIFICANT CHANGE UP (ref 0–0.9)
MONOCYTES NFR BLD AUTO: 1.2 % — LOW (ref 2–14)
MRSA PCR RESULT.: SIGNIFICANT CHANGE UP
NEUTROPHILS # BLD AUTO: 8.54 K/UL — HIGH (ref 1.8–7.4)
NEUTROPHILS NFR BLD AUTO: 95 % — HIGH (ref 43–77)
NRBC # BLD: 0 /100 WBCS — SIGNIFICANT CHANGE UP (ref 0–0)
NRBC # FLD: 0 K/UL — SIGNIFICANT CHANGE UP (ref 0–0)
PHOSPHATE SERPL-MCNC: 2.8 MG/DL — SIGNIFICANT CHANGE UP (ref 2.5–4.5)
PLATELET # BLD AUTO: 148 K/UL — LOW (ref 150–400)
POTASSIUM SERPL-MCNC: 3.7 MMOL/L — SIGNIFICANT CHANGE UP (ref 3.5–5.3)
POTASSIUM SERPL-SCNC: 3.7 MMOL/L — SIGNIFICANT CHANGE UP (ref 3.5–5.3)
PROT SERPL-MCNC: 6.6 G/DL — SIGNIFICANT CHANGE UP (ref 6–8.3)
RAPID RVP RESULT: SIGNIFICANT CHANGE UP
RBC # BLD: 3.59 M/UL — LOW (ref 4.2–5.8)
RBC # FLD: 13 % — SIGNIFICANT CHANGE UP (ref 10.3–14.5)
RSV RNA SPEC QL NAA+PROBE: SIGNIFICANT CHANGE UP
RV+EV RNA SPEC QL NAA+PROBE: SIGNIFICANT CHANGE UP
S AUREUS DNA NOSE QL NAA+PROBE: SIGNIFICANT CHANGE UP
SARS-COV-2 RNA SPEC QL NAA+PROBE: SIGNIFICANT CHANGE UP
SODIUM SERPL-SCNC: 138 MMOL/L — SIGNIFICANT CHANGE UP (ref 135–145)
SPECIMEN SOURCE: SIGNIFICANT CHANGE UP
WBC # BLD: 8.99 K/UL — SIGNIFICANT CHANGE UP (ref 3.8–10.5)
WBC # FLD AUTO: 8.99 K/UL — SIGNIFICANT CHANGE UP (ref 3.8–10.5)

## 2024-02-16 PROCEDURE — 99232 SBSQ HOSP IP/OBS MODERATE 35: CPT | Mod: GC

## 2024-02-16 RX ORDER — AZITHROMYCIN 500 MG/1
500 TABLET, FILM COATED ORAL EVERY 24 HOURS
Refills: 0 | Status: COMPLETED | OUTPATIENT
Start: 2024-02-17 | End: 2024-02-17

## 2024-02-16 RX ORDER — SENNA PLUS 8.6 MG/1
2 TABLET ORAL AT BEDTIME
Refills: 0 | Status: DISCONTINUED | OUTPATIENT
Start: 2024-02-16 | End: 2024-02-21

## 2024-02-16 RX ADMIN — Medication 4 MILLIGRAM(S): at 05:46

## 2024-02-16 RX ADMIN — Medication 4 MILLIGRAM(S): at 10:57

## 2024-02-16 RX ADMIN — Medication 4 MILLIGRAM(S): at 22:54

## 2024-02-16 RX ADMIN — ATORVASTATIN CALCIUM 20 MILLIGRAM(S): 80 TABLET, FILM COATED ORAL at 22:53

## 2024-02-16 RX ADMIN — CEFTRIAXONE 100 MILLIGRAM(S): 500 INJECTION, POWDER, FOR SOLUTION INTRAMUSCULAR; INTRAVENOUS at 18:03

## 2024-02-16 RX ADMIN — Medication 600 MILLIGRAM(S): at 18:27

## 2024-02-16 RX ADMIN — AZITHROMYCIN 255 MILLIGRAM(S): 500 TABLET, FILM COATED ORAL at 16:35

## 2024-02-16 RX ADMIN — Medication 4 MILLIGRAM(S): at 16:38

## 2024-02-16 NOTE — DIETITIAN INITIAL EVALUATION ADULT - PERTINENT MEDS FT
MEDICATIONS  (STANDING):  atorvastatin 20 milliGRAM(s) Oral at bedtime  azithromycin  IVPB 500 milliGRAM(s) IV Intermittent once  cefTRIAXone   IVPB 1000 milliGRAM(s) IV Intermittent every 24 hours  dexAMETHasone  Injectable 4 milliGRAM(s) IV Push every 6 hours  guaiFENesin  milliGRAM(s) Oral every 12 hours    MEDICATIONS  (PRN):  acetaminophen     Tablet .. 650 milliGRAM(s) Oral every 6 hours PRN Temp greater or equal to 38C (100.4F), Mild Pain (1 - 3)  albuterol/ipratropium for Nebulization 3 milliLiter(s) Nebulizer every 6 hours PRN Shortness of Breath and/or Wheezing

## 2024-02-16 NOTE — PATIENT PROFILE ADULT - NS PRO AD NO ADVANCE DIRECTIVE
1  Over-the-counter children's ibuprofen and or acetaminophen as needed for fever pain  2  Increase oral fluids  3  Operate a vaporizer in the patient sleeping area until symptoms improve  4  Go to the ER immediately for any respiratory distress  5  Follow-up with the primary pediatrician in 5-7 days 
No

## 2024-02-16 NOTE — DIETITIAN INITIAL EVALUATION ADULT - PROBLEM SELECTOR PLAN 1
WBC 10.4 but with 9.6% bands, HR >90, lactate 2.4, now normalized to 1  multifocal PNA on CT chest, with increased SOB  UA with no bacteria, 11 epithelial cells  - treatment of PNA as below  - f/u Bcx2 (drawn 2 hours after abx)  - f/u Ucx  - RVP

## 2024-02-16 NOTE — DIETITIAN INITIAL EVALUATION ADULT - ADD RECOMMEND
1) Recommend continue with current diet, which remains appropriate at this time.   2) Encourage PO intake and honor food preferences as able.   3) Monitor PO intake, Labs, weights, BMs, and skin integrity.   4) RD to remain available for further nutritional interventions as indicated.  1) Recommend continue with current diet, which remains appropriate at this time.   2) Encourage PO intake and honor food preferences as able.   3) Monitor PO intake, Labs, weights, BMs, and skin integrity.   4) Recommend consider add bowel regimen to promote bowel regularity.   5) RD to remain available for further nutritional interventions as indicated.

## 2024-02-16 NOTE — DIETITIAN INITIAL EVALUATION ADULT - OTHER INFO
Per chart review, 77 yo M with history of dementia, colon cancer s/p colectomy in 2019, hyperlipidemia, ?ILD presenting with confusion and visual hallucinations for past 2 days found to have multifocal pneumonia on CT chest along with hyperdense lesion in CT head concerning for hemorrhagic lesion vs cavernoma, pending MRI.    Patient seen at bedside. Nutrition interview conducted in Mandarin Chinese which writer is fluent in Mandarin. Pt reports worsening PO intake in hospital due to dislike food items and lack of appetite. Pt's breakfast visibly seen with <50% consumption at the time of visit. Pt states his daughter will bring him lunch today. Pt noted currently on soft and bite sized diet. Denies chewing and swallowing difficulties. Food preferences explored and honored to encourage PO intake. Pt is amenable to receive Ensure Plus HP 2x daily (350cal, 20gm pro each) to provide optimal nutrition. Pt noted s/p IV hydration. Pt c/o constipation amenable to receive prune juice @ BK+ L. Pt's last BM reported was 2 days ago. Recommend consider add bowel regimen to promote bowel regularity.     Provided patient with High Calorie, High Protein Nutrition Therapy handout in Chinese. Discussed food choices that's high in calorie and protein. Encouraged pt to consume small frequent meals to increase protein and calorie intake. RD to remain available for further nutritional interventions as indicated.  Per chart review, 79 yo M with history of dementia, colon cancer s/p colectomy in 2019, hyperlipidemia, ?ILD presenting with confusion and visual hallucinations for past 2 days found to have multifocal pneumonia on CT chest along with hyperdense lesion in CT head concerning for hemorrhagic lesion vs cavernoma, pending MRI.    Patient seen at bedside. Nutrition interview conducted in Mandarin, which writer is fluent in Mandarin. Pt reports worsening PO intake in hospital due to dislike food items and lack of appetite. Pt's breakfast visibly seen with <50% consumption at the time of visit. Pt states his daughter will bring him lunch today. Pt noted currently on soft and bite sized diet. Denies chewing and swallowing difficulties. Food preferences explored and honored to encourage PO intake. Pt is amenable to receive Ensure Plus HP 2x daily (350cal, 20gm pro each) to provide optimal nutrition. Pt noted s/p IV hydration. Pt c/o constipation amenable to receive prune juice @ BK+ L. Pt's last BM reported was 2 days ago. Recommend consider add bowel regimen to promote bowel regularity.     Provided patient with High Calorie, High Protein Nutrition Therapy handout in Chinese. Discussed food choices that's high in calorie and protein. Encouraged pt to consume small frequent meals to increase protein and calorie intake. RD to remain available for further nutritional interventions as indicated.

## 2024-02-16 NOTE — DIETITIAN INITIAL EVALUATION ADULT - PROBLEM SELECTOR PLAN 2
Increased SOB for past few days, with CT findings of multifocal PNA  - empiric CTX x5d  - azithro 500 x3d  - urine legionella  - urine strep  - sputum culture  - duonebs prn

## 2024-02-16 NOTE — DIETITIAN INITIAL EVALUATION ADULT - PROBLEM SELECTOR PLAN 5
hx resection in 2019, in remission, no chemotherapy or radiation. no longer follows with oncologist.

## 2024-02-16 NOTE — PROGRESS NOTE ADULT - PROBLEM SELECTOR PLAN 2
Increased SOB for past few days, with CT findings of multifocal PNA  - empiric CTX x5d  - azithro 500 x3d  - f/u  urine legionella  - f/u urine strep  - sputum culture  - duonebs prn

## 2024-02-16 NOTE — DIETITIAN INITIAL EVALUATION ADULT - ORAL INTAKE PTA/DIET HISTORY
Patient reports his appetite has decreased over the past few years since the colectomy procedure. Patient endorses 10lbs of weight loss over the past 1-2 years. Patient reported UBW 110lbs -last year. Pt reports has had problem with swallowing food approximately 1yr ago due to GERD, states "foods get stuck" in his throat and he has to "vomit the food back out". Pt reports his swallowing has gradually improved now, able to tolerate soft foods such as noodles, congee, and wonton.

## 2024-02-16 NOTE — DIETITIAN INITIAL EVALUATION ADULT - PROBLEM SELECTOR PLAN 4
per daughter, patient at baseline still drives and takes care of self as recently as last week  - no parkinsons per outpatient neurologist

## 2024-02-16 NOTE — PATIENT PROFILE ADULT - FALL HARM RISK - HARM RISK INTERVENTIONS

## 2024-02-16 NOTE — PROGRESS NOTE ADULT - SUBJECTIVE AND OBJECTIVE BOX
PROGRESS NOTE:   Authored by Misha Busby MD  Internal Medicine      Patient is a 78y old  Male who presents with a chief complaint of altered mental status, shortness of breath (15 Feb 2024 17:13)      SUBJECTIVE / OVERNIGHT EVENTS: NAEO, patient seen and examined at bedside. [facility]  [facility] [facility] #[id number]. States no SOB, wheezing, no abdominal pain, chest pain. States no more visual hallucinations.     MEDICATIONS  (STANDING):  atorvastatin 20 milliGRAM(s) Oral at bedtime  azithromycin  IVPB 500 milliGRAM(s) IV Intermittent once  cefTRIAXone   IVPB 1000 milliGRAM(s) IV Intermittent every 24 hours  dexAMETHasone  Injectable 4 milliGRAM(s) IV Push every 6 hours  guaiFENesin  milliGRAM(s) Oral every 12 hours    MEDICATIONS  (PRN):  acetaminophen     Tablet .. 650 milliGRAM(s) Oral every 6 hours PRN Temp greater or equal to 38C (100.4F), Mild Pain (1 - 3)  albuterol/ipratropium for Nebulization 3 milliLiter(s) Nebulizer every 6 hours PRN Shortness of Breath and/or Wheezing      CAPILLARY BLOOD GLUCOSE        I&O's Summary      PHYSICAL EXAM:  Vital Signs Last 24 Hrs  T(C): 37.2 (16 Feb 2024 05:17), Max: 37.2 (16 Feb 2024 05:17)  T(F): 98.9 (16 Feb 2024 05:17), Max: 98.9 (16 Feb 2024 05:17)  HR: 84 (16 Feb 2024 05:17) (84 - 97)  BP: 107/55 (16 Feb 2024 05:17) (102/59 - 110/65)  BP(mean): 68 (16 Feb 2024 05:17) (68 - 69)  RR: 18 (16 Feb 2024 05:17) (15 - 18)  SpO2: 98% (16 Feb 2024 05:17) (94% - 100%)    Parameters below as of 16 Feb 2024 05:17  Patient On (Oxygen Delivery Method): room air      CONSTITUTIONAL: Well-groomed, in no apparent distress  EYES: No conjunctival or scleral injection, non-icteric;   ENMT: No external nasal lesions; MMM  RESPIRATORY: Breathing comfortably; lungs CTA without wheeze/rhonchi/rales  CARDIOVASCULAR: +S1S2, RRR, no M/G/R; no lower extremity edema  GASTROINTESTINAL: No palpable masses or tenderness, +BS throughout, no rebound/guarding  SKIN: No rashes or ulcers noted  NEUROLOGIC: CN II-XII intact; sensation intact in LEs b/l to light touch  PSYCHIATRIC: A+O x 3; mood and affect appropriate; appropriate insight and judgment    LABS:                        10.8   8.99  )-----------( 148      ( 16 Feb 2024 06:00 )             32.2     02-16    138  |  103  |  28<H>  ----------------------------<  138<H>  3.7   |  25  |  0.63    Ca    8.6      16 Feb 2024 06:00  Phos  2.8     02-16  Mg     2.30     02-16    TPro  6.6  /  Alb  3.0<L>  /  TBili  0.5  /  DBili  x   /  AST  32  /  ALT  35  /  AlkPhos  113  02-16    PT/INR - ( 15 Feb 2024 14:22 )   PT: 12.4 sec;   INR: 1.10 ratio         PTT - ( 15 Feb 2024 14:22 )  PTT:28.9 sec      Urinalysis Basic - ( 16 Feb 2024 06:00 )    Color: x / Appearance: x / SG: x / pH: x  Gluc: 138 mg/dL / Ketone: x  / Bili: x / Urobili: x   Blood: x / Protein: x / Nitrite: x   Leuk Esterase: x / RBC: x / WBC x   Sq Epi: x / Non Sq Epi: x / Bacteria: x          RADIOLOGY & ADDITIONAL TESTS:  Results Reviewed:   Imaging Personally Reviewed:  Electrocardiogram Personally Reviewed:    COORDINATION OF CARE:  Care Discussed with Consultants/Other Providers [Y/N]:

## 2024-02-16 NOTE — DIETITIAN INITIAL EVALUATION ADULT - NS FNS WEIGHT CHANGE REASON
No Ht and Wt available in EMR. Obtained pt's weight via bed scale-100lbs.  Pt reported his Ht~ 165cm, UBW 110lbs 1yr ago./unintentional

## 2024-02-17 LAB
A1C WITH ESTIMATED AVERAGE GLUCOSE RESULT: 5.4 % — SIGNIFICANT CHANGE UP (ref 4–5.6)
ALBUMIN SERPL ELPH-MCNC: 3 G/DL — LOW (ref 3.3–5)
ALP SERPL-CCNC: 127 U/L — HIGH (ref 40–120)
ALT FLD-CCNC: 53 U/L — HIGH (ref 4–41)
ANION GAP SERPL CALC-SCNC: 9 MMOL/L — SIGNIFICANT CHANGE UP (ref 7–14)
AST SERPL-CCNC: 61 U/L — HIGH (ref 4–40)
BILIRUB SERPL-MCNC: 0.2 MG/DL — SIGNIFICANT CHANGE UP (ref 0.2–1.2)
BUN SERPL-MCNC: 34 MG/DL — HIGH (ref 7–23)
CALCIUM SERPL-MCNC: 8.7 MG/DL — SIGNIFICANT CHANGE UP (ref 8.4–10.5)
CHLORIDE SERPL-SCNC: 105 MMOL/L — SIGNIFICANT CHANGE UP (ref 98–107)
CO2 SERPL-SCNC: 26 MMOL/L — SIGNIFICANT CHANGE UP (ref 22–31)
CREAT SERPL-MCNC: 0.6 MG/DL — SIGNIFICANT CHANGE UP (ref 0.5–1.3)
EGFR: 100 ML/MIN/1.73M2 — SIGNIFICANT CHANGE UP
ESTIMATED AVERAGE GLUCOSE: 108 — SIGNIFICANT CHANGE UP
GLUCOSE SERPL-MCNC: 138 MG/DL — HIGH (ref 70–99)
GRAM STN FLD: ABNORMAL
HCT VFR BLD CALC: 31.4 % — LOW (ref 39–50)
HGB BLD-MCNC: 10.5 G/DL — LOW (ref 13–17)
LEGIONELLA AG UR QL: NEGATIVE — SIGNIFICANT CHANGE UP
LEGIONELLA AG UR QL: NEGATIVE — SIGNIFICANT CHANGE UP
MAGNESIUM SERPL-MCNC: 2.3 MG/DL — SIGNIFICANT CHANGE UP (ref 1.6–2.6)
MCHC RBC-ENTMCNC: 30.1 PG — SIGNIFICANT CHANGE UP (ref 27–34)
MCHC RBC-ENTMCNC: 33.4 GM/DL — SIGNIFICANT CHANGE UP (ref 32–36)
MCV RBC AUTO: 90 FL — SIGNIFICANT CHANGE UP (ref 80–100)
NRBC # BLD: 0 /100 WBCS — SIGNIFICANT CHANGE UP (ref 0–0)
NRBC # FLD: 0 K/UL — SIGNIFICANT CHANGE UP (ref 0–0)
PHOSPHATE SERPL-MCNC: 3.1 MG/DL — SIGNIFICANT CHANGE UP (ref 2.5–4.5)
PLATELET # BLD AUTO: 164 K/UL — SIGNIFICANT CHANGE UP (ref 150–400)
POTASSIUM SERPL-MCNC: 3.9 MMOL/L — SIGNIFICANT CHANGE UP (ref 3.5–5.3)
POTASSIUM SERPL-SCNC: 3.9 MMOL/L — SIGNIFICANT CHANGE UP (ref 3.5–5.3)
PROT SERPL-MCNC: 6 G/DL — SIGNIFICANT CHANGE UP (ref 6–8.3)
RBC # BLD: 3.49 M/UL — LOW (ref 4.2–5.8)
RBC # FLD: 12.8 % — SIGNIFICANT CHANGE UP (ref 10.3–14.5)
S PNEUM AG UR QL: NEGATIVE — SIGNIFICANT CHANGE UP
S PNEUM AG UR QL: NEGATIVE — SIGNIFICANT CHANGE UP
SODIUM SERPL-SCNC: 140 MMOL/L — SIGNIFICANT CHANGE UP (ref 135–145)
SPECIMEN SOURCE: SIGNIFICANT CHANGE UP
WBC # BLD: 12.29 K/UL — HIGH (ref 3.8–10.5)
WBC # FLD AUTO: 12.29 K/UL — HIGH (ref 3.8–10.5)

## 2024-02-17 PROCEDURE — 99232 SBSQ HOSP IP/OBS MODERATE 35: CPT | Mod: GC

## 2024-02-17 RX ORDER — DEXAMETHASONE 0.5 MG/5ML
4 ELIXIR ORAL EVERY 6 HOURS
Refills: 0 | Status: DISCONTINUED | OUTPATIENT
Start: 2024-02-17 | End: 2024-02-17

## 2024-02-17 RX ORDER — DEXAMETHASONE 0.5 MG/5ML
4 ELIXIR ORAL EVERY 6 HOURS
Refills: 0 | Status: DISCONTINUED | OUTPATIENT
Start: 2024-02-17 | End: 2024-02-20

## 2024-02-17 RX ADMIN — CEFTRIAXONE 100 MILLIGRAM(S): 500 INJECTION, POWDER, FOR SOLUTION INTRAMUSCULAR; INTRAVENOUS at 17:04

## 2024-02-17 RX ADMIN — Medication 200 MILLIGRAM(S): at 17:04

## 2024-02-17 RX ADMIN — SENNA PLUS 2 TABLET(S): 8.6 TABLET ORAL at 23:04

## 2024-02-17 RX ADMIN — Medication 4 MILLIGRAM(S): at 17:04

## 2024-02-17 RX ADMIN — ATORVASTATIN CALCIUM 20 MILLIGRAM(S): 80 TABLET, FILM COATED ORAL at 23:03

## 2024-02-17 RX ADMIN — Medication 4 MILLIGRAM(S): at 09:35

## 2024-02-17 RX ADMIN — Medication 600 MILLIGRAM(S): at 05:29

## 2024-02-17 RX ADMIN — Medication 600 MILLIGRAM(S): at 13:37

## 2024-02-17 RX ADMIN — Medication 4 MILLIGRAM(S): at 23:04

## 2024-02-17 RX ADMIN — Medication 200 MILLIGRAM(S): at 23:04

## 2024-02-17 RX ADMIN — AZITHROMYCIN 255 MILLIGRAM(S): 500 TABLET, FILM COATED ORAL at 16:01

## 2024-02-17 RX ADMIN — Medication 4 MILLIGRAM(S): at 05:28

## 2024-02-17 NOTE — SWALLOW BEDSIDE ASSESSMENT ADULT - SWALLOW EVAL: PROGNOSIS
Patient initially took consecutive cup sips of thin liquids with throat clearing post swallow; patient encouraged to take small single cup sips however patient with persistent throat clearing. Of Note: patient denied "stuck sensation" across PO trials

## 2024-02-17 NOTE — SWALLOW BEDSIDE ASSESSMENT ADULT - CONSISTENCIES ADMINISTERED
3 tsp trials/soft & bite-sized ~4 oz/thin liquid ~3 oz/mildly thick half cracker/regular solid ~2 oz/moderately thick ~3 oz/pureed

## 2024-02-17 NOTE — SWALLOW BEDSIDE ASSESSMENT ADULT - SWALLOW EVAL: DIAGNOSIS
1. Mild oral dysphagia for regular solids characterized by prolonged mastication, adequate anterior to posterior transport with adequate oral clearance. 2. Functional oral stage for puree, soft and bite-sized solids, moderately thick liquids, mildly thick liquids and thin liquids characterized by adequate acceptance and containment, adequate mastication of soft and bite-sized solids with adequate anterior to posterior transport and adequate oral clearance. 3. Functional pharyngeal stage suspected for puree, soft and bite-sized solids, moderately thick liquids and mildly thick liquids characterized by initiation of the pharyngeal swallow and hyolaryngeal excursion upon digital palpation with no overt s/s penetration/ aspiration noted. 4. Moderate pharyngeal dysphagia suspected for regular solids and thin liquids characterized by initiation of the pharyngeal swallow and hyolaryngeal excursion upon digital palpation with throat clearing noted post swallow. continued below

## 2024-02-17 NOTE — PROGRESS NOTE ADULT - PROBLEM SELECTOR PLAN 2
Increased SOB for past few days, with CT findings of multifocal PNA  - empiric CTX x5d  - azithro 500 x3d  - f/u  urine legionella  - f/u urine strep  - sputum culture  - duonebs prn Increased SOB for past few days, with CT findings of multifocal PNA  - empiric CTX x5d (2/15 - 2/19)  - azithro 500 x3d (2/15-2/17)  - f/u  urine legionella  - f/u urine strep  - sputum culture  - duonebs prn

## 2024-02-17 NOTE — SWALLOW BEDSIDE ASSESSMENT ADULT - ASR SWALLOW RECOMMEND DIAG
Cinesophagram given patient reporting inconsistent "stuck sensation" and multifocal pneumonia to determine if chest imaging is dysphagia/ aspiration related/VFSS/MBS

## 2024-02-17 NOTE — SWALLOW BEDSIDE ASSESSMENT ADULT - COMMENTS
consecutive cup sips initially with throat clearing; cued for small single cup sips however without benefit continued with throat clearing Internal Medicine 2/17, "79 yo M with history of dementia, colon cancer s/p colectomy in 2019, hyperlipidemia, ?ILD presenting with confusion and visual hallucinations for past 2 days found to have multifocal pneumonia on CT chest along with hyperdense lesion in CT head concerning for hemorrhagic lesion vs cavernoma, pending MRI."    CTA Chest 2/15: IMPRESSION: Multifocal pneumonia. No pulmonary embolism. Limited evaluation of bowel secondary to lack of intraperitoneal fat and oral contrast. No gross evidence of bowel obstruction.    CTH 2/15: IMPRESSION: Nonspecific 1.1 x 0.9 cm hyperdensity in the right cerebellum which may represent hyperdense/hemorrhagic lesion versus calcified mass/cavernoma. MRI of the brain with contrast recommended for further evaluation.    Patient received upright awake/ alert in bed, daughter present at bedside, patient Cantonese speaking, daughter provided translation per patient preference, patient able to make basic wants/ needs known and follow simple directives. Patient reporting intermittent "stuck sensation" with "soft solids" patient reports able to eat regular solids (fruit, nuts) but will sometimes only eat soft solids. Patient denies difficulty with drinking. Patient indicated "I had a swallowing test a while ago (per daughter "I think over a year ago and at [facility] I think") they showed me the screen and the food moving slow" Patient thinks diet recommended was soft solids and thin liquids. Patient reports "stuck sensation" has been ongoing for ~one and a half years but in recent "~6months it has been better."

## 2024-02-17 NOTE — PHYSICAL THERAPY INITIAL EVALUATION ADULT - PATIENT PROFILE REVIEW, REHAB EVAL
PT initial evaluation received and chart review completed. Pt agreeable to participate in PT evaluation. Pt cleared by VALREIE Manrique. ACTIVITY: OOB WITH ASSISTANCE/yes

## 2024-02-17 NOTE — SWALLOW BEDSIDE ASSESSMENT ADULT - MODE OF PRESENTATION
self fed cup/self fed spoon/fed by clinician consecutive cup sips; cued for small single cup sips without benefit/cup

## 2024-02-17 NOTE — SWALLOW BEDSIDE ASSESSMENT ADULT - ADDITIONAL RECOMMENDATIONS
1. This service to follow for diet tolerance as schedule permits. 2. Medical team advised to reconsult this service if patient is with a change in medical status or change in tolerance of recommended PO. 3. Monitor for any changes in neurological status that may impact PO intake.

## 2024-02-17 NOTE — PHYSICAL THERAPY INITIAL EVALUATION ADULT - GAIT DEVIATIONS NOTED, PT EVAL
occasional unsteadiness; x 1 episode of loss of balance laterally requiring therapist assistance/decreased debra/decreased weight-shifting ability

## 2024-02-17 NOTE — PHYSICAL THERAPY INITIAL EVALUATION ADULT - MANUAL MUSCLE TESTING RESULTS, REHAB EVAL
Bedside report obtained from Michaela Montiel RN using [employer]. no strength deficits were identified

## 2024-02-17 NOTE — PHYSICAL THERAPY INITIAL EVALUATION ADULT - PERTINENT HX OF CURRENT PROBLEM, REHAB EVAL
Pt is a 78 year old male presenting with confusion and visual hallucinations. CT head significant for hyperdensity in the right cerebellum which may represent hyperdense/hemorrhagic lesion versus calcified mass/cavernoma; MRI pending. CTA chest revealed multiple pneumonia. Pt admitted for severe sepsis secondary to PNA and lesion of the brain.

## 2024-02-18 ENCOUNTER — TRANSCRIPTION ENCOUNTER (OUTPATIENT)
Age: 79
End: 2024-02-18

## 2024-02-18 LAB
ALBUMIN SERPL ELPH-MCNC: 2.9 G/DL — LOW (ref 3.3–5)
ALP SERPL-CCNC: 122 U/L — HIGH (ref 40–120)
ALT FLD-CCNC: 69 U/L — HIGH (ref 4–41)
ANION GAP SERPL CALC-SCNC: 8 MMOL/L — SIGNIFICANT CHANGE UP (ref 7–14)
AST SERPL-CCNC: 60 U/L — HIGH (ref 4–40)
BILIRUB SERPL-MCNC: 0.2 MG/DL — SIGNIFICANT CHANGE UP (ref 0.2–1.2)
BUN SERPL-MCNC: 26 MG/DL — HIGH (ref 7–23)
CALCIUM SERPL-MCNC: 8.8 MG/DL — SIGNIFICANT CHANGE UP (ref 8.4–10.5)
CHLORIDE SERPL-SCNC: 104 MMOL/L — SIGNIFICANT CHANGE UP (ref 98–107)
CO2 SERPL-SCNC: 25 MMOL/L — SIGNIFICANT CHANGE UP (ref 22–31)
CREAT SERPL-MCNC: 0.59 MG/DL — SIGNIFICANT CHANGE UP (ref 0.5–1.3)
EGFR: 101 ML/MIN/1.73M2 — SIGNIFICANT CHANGE UP
GLUCOSE SERPL-MCNC: 124 MG/DL — HIGH (ref 70–99)
HCT VFR BLD CALC: 38.9 % — LOW (ref 39–50)
HGB BLD-MCNC: 12.1 G/DL — LOW (ref 13–17)
MAGNESIUM SERPL-MCNC: 2.2 MG/DL — SIGNIFICANT CHANGE UP (ref 1.6–2.6)
MCHC RBC-ENTMCNC: 30.2 PG — SIGNIFICANT CHANGE UP (ref 27–34)
MCHC RBC-ENTMCNC: 31.1 GM/DL — LOW (ref 32–36)
MCV RBC AUTO: 97 FL — SIGNIFICANT CHANGE UP (ref 80–100)
NRBC # BLD: 0 /100 WBCS — SIGNIFICANT CHANGE UP (ref 0–0)
NRBC # FLD: 0 K/UL — SIGNIFICANT CHANGE UP (ref 0–0)
PHOSPHATE SERPL-MCNC: 3.2 MG/DL — SIGNIFICANT CHANGE UP (ref 2.5–4.5)
PLATELET # BLD AUTO: 176 K/UL — SIGNIFICANT CHANGE UP (ref 150–400)
POTASSIUM SERPL-MCNC: 4.3 MMOL/L — SIGNIFICANT CHANGE UP (ref 3.5–5.3)
POTASSIUM SERPL-SCNC: 4.3 MMOL/L — SIGNIFICANT CHANGE UP (ref 3.5–5.3)
PROT SERPL-MCNC: 6.4 G/DL — SIGNIFICANT CHANGE UP (ref 6–8.3)
RBC # BLD: 4.01 M/UL — LOW (ref 4.2–5.8)
RBC # FLD: 13.3 % — SIGNIFICANT CHANGE UP (ref 10.3–14.5)
SODIUM SERPL-SCNC: 137 MMOL/L — SIGNIFICANT CHANGE UP (ref 135–145)
WBC # BLD: 9.38 K/UL — SIGNIFICANT CHANGE UP (ref 3.8–10.5)
WBC # FLD AUTO: 9.38 K/UL — SIGNIFICANT CHANGE UP (ref 3.8–10.5)

## 2024-02-18 PROCEDURE — 99232 SBSQ HOSP IP/OBS MODERATE 35: CPT | Mod: GC

## 2024-02-18 RX ORDER — DONEPEZIL HYDROCHLORIDE 10 MG/1
5 TABLET, FILM COATED ORAL AT BEDTIME
Refills: 0 | Status: DISCONTINUED | OUTPATIENT
Start: 2024-02-18 | End: 2024-02-21

## 2024-02-18 RX ADMIN — SENNA PLUS 2 TABLET(S): 8.6 TABLET ORAL at 23:05

## 2024-02-18 RX ADMIN — Medication 200 MILLIGRAM(S): at 17:08

## 2024-02-18 RX ADMIN — Medication 200 MILLIGRAM(S): at 23:05

## 2024-02-18 RX ADMIN — Medication 4 MILLIGRAM(S): at 05:21

## 2024-02-18 RX ADMIN — CEFTRIAXONE 100 MILLIGRAM(S): 500 INJECTION, POWDER, FOR SOLUTION INTRAMUSCULAR; INTRAVENOUS at 17:04

## 2024-02-18 RX ADMIN — Medication 4 MILLIGRAM(S): at 23:05

## 2024-02-18 RX ADMIN — Medication 200 MILLIGRAM(S): at 11:07

## 2024-02-18 RX ADMIN — ATORVASTATIN CALCIUM 20 MILLIGRAM(S): 80 TABLET, FILM COATED ORAL at 23:05

## 2024-02-18 RX ADMIN — Medication 4 MILLIGRAM(S): at 17:08

## 2024-02-18 RX ADMIN — Medication 200 MILLIGRAM(S): at 05:23

## 2024-02-18 RX ADMIN — Medication 4 MILLIGRAM(S): at 11:07

## 2024-02-18 RX ADMIN — DONEPEZIL HYDROCHLORIDE 5 MILLIGRAM(S): 10 TABLET, FILM COATED ORAL at 23:05

## 2024-02-18 NOTE — PROGRESS NOTE ADULT - PROBLEM SELECTOR PLAN 2
Increased SOB for past few days, with CT findings of multifocal PNA  - empiric CTX x5d (2/15 - 2/19)  - azithro 500 x3d (2/15-2/17)  - f/u  urine legionella  - f/u urine strep  - sputum culture  - duonebs prn

## 2024-02-18 NOTE — PROGRESS NOTE ADULT - SUBJECTIVE AND OBJECTIVE BOX
Patient is a 78y old  Male who presents with a chief complaint of altered mental status, shortness of breath (17 Feb 2024 07:12)      SUBJECTIVE / OVERNIGHT EVENTS:    Brief Daily Plan     MEDICATIONS  (STANDING):  atorvastatin 20 milliGRAM(s) Oral at bedtime  cefTRIAXone   IVPB 1000 milliGRAM(s) IV Intermittent every 24 hours  dexAMETHasone     Tablet 4 milliGRAM(s) Oral every 6 hours  guaiFENesin Oral Liquid (Sugar-Free) 200 milliGRAM(s) Oral every 6 hours  senna 2 Tablet(s) Oral at bedtime    MEDICATIONS  (PRN):  acetaminophen     Tablet .. 650 milliGRAM(s) Oral every 6 hours PRN Temp greater or equal to 38C (100.4F), Mild Pain (1 - 3)  albuterol/ipratropium for Nebulization 3 milliLiter(s) Nebulizer every 6 hours PRN Shortness of Breath and/or Wheezing      Vital Signs Last 24 Hrs  T(C): 36.8 (18 Feb 2024 05:08), Max: 36.8 (18 Feb 2024 05:08)  T(F): 98.2 (18 Feb 2024 05:08), Max: 98.2 (18 Feb 2024 05:08)  HR: 53 (18 Feb 2024 05:08) (53 - 75)  BP: 119/59 (18 Feb 2024 05:08) (117/67 - 142/66)  BP(mean): --  RR: 17 (18 Feb 2024 05:08) (17 - 17)  SpO2: 98% (18 Feb 2024 05:08) (95% - 98%)    Parameters below as of 18 Feb 2024 05:08  Patient On (Oxygen Delivery Method): room air      CAPILLARY BLOOD GLUCOSE        I&O's Summary      PHYSICAL EXAM:  GENERAL: NAD, well-developed  HEAD:  Atraumatic, Normocephalic  EYES: EOMI, PERRLA, conjunctiva and sclera clear  NECK: Supple, No JVD  CHEST/LUNG: Clear to auscultation bilaterally; No wheeze  HEART: Regular rate and rhythm; No murmurs, rubs, or gallops  ABDOMEN: Soft, Nontender, Nondistended; Bowel sounds present  EXTREMITIES:  2+ Peripheral Pulses, No clubbing, cyanosis, or edema  PSYCH: AAOx3  NEUROLOGY: non-focal  SKIN: No rashes or lesions    LABS:                        10.5   12.29 )-----------( 164      ( 17 Feb 2024 05:50 )             31.4     02-17    140  |  105  |  34<H>  ----------------------------<  138<H>  3.9   |  26  |  0.60    Ca    8.7      17 Feb 2024 05:50  Phos  3.1     02-17  Mg     2.30     02-17    TPro  6.0  /  Alb  3.0<L>  /  TBili  0.2  /  DBili  x   /  AST  61<H>  /  ALT  53<H>  /  AlkPhos  127<H>  02-17          Urinalysis Basic - ( 17 Feb 2024 05:50 )    Color: x / Appearance: x / SG: x / pH: x  Gluc: 138 mg/dL / Ketone: x  / Bili: x / Urobili: x   Blood: x / Protein: x / Nitrite: x   Leuk Esterase: x / RBC: x / WBC x   Sq Epi: x / Non Sq Epi: x / Bacteria: x        RADIOLOGY & ADDITIONAL TESTS:    Imaging Personally Reviewed:    Consultant(s) Notes Reviewed:      Care Discussed with Consultants/Other Providers:   Patient is a 78y old  Male who presents with a chief complaint of altered mental status, shortness of breath (17 Feb 2024 07:12)      SUBJECTIVE / OVERNIGHT EVENTS: No acute events overnight. Pt seen and examined with wife at bedside. Sofia  ID [id number]. Pt reports doing well and in no acute distress. Denies any additional visual hallucinations. Per wife at bedside, 1 episode "pt shining light at jacket to look at bugs that were not there". Denies fever, nausea, vomiting, CP, SOB, changes in BM or dysuria. Endorses chronic, productive cough w/o dyspnea.     Brief Daily Plan   - C/w CTX for CAP (day 4/5)   - Pending MRI Head  - C/w PO dexamethasone until MRI head --> will d/c if hallucinations worsen and increase in frequency     MEDICATIONS  (STANDING):  atorvastatin 20 milliGRAM(s) Oral at bedtime  cefTRIAXone   IVPB 1000 milliGRAM(s) IV Intermittent every 24 hours  dexAMETHasone     Tablet 4 milliGRAM(s) Oral every 6 hours  guaiFENesin Oral Liquid (Sugar-Free) 200 milliGRAM(s) Oral every 6 hours  senna 2 Tablet(s) Oral at bedtime    MEDICATIONS  (PRN):  acetaminophen     Tablet .. 650 milliGRAM(s) Oral every 6 hours PRN Temp greater or equal to 38C (100.4F), Mild Pain (1 - 3)  albuterol/ipratropium for Nebulization 3 milliLiter(s) Nebulizer every 6 hours PRN Shortness of Breath and/or Wheezing      Vital Signs Last 24 Hrs  T(C): 36.8 (18 Feb 2024 05:08), Max: 36.8 (18 Feb 2024 05:08)  T(F): 98.2 (18 Feb 2024 05:08), Max: 98.2 (18 Feb 2024 05:08)  HR: 53 (18 Feb 2024 05:08) (53 - 75)  BP: 119/59 (18 Feb 2024 05:08) (117/67 - 142/66)  BP(mean): --  RR: 17 (18 Feb 2024 05:08) (17 - 17)  SpO2: 98% (18 Feb 2024 05:08) (95% - 98%)    Parameters below as of 18 Feb 2024 05:08  Patient On (Oxygen Delivery Method): room air      CAPILLARY BLOOD GLUCOSE        I&O's Summary      PHYSICAL EXAM:  GENERAL: NAD, well-developed  HEAD:  Atraumatic, Normocephalic  EYES:  conjunctiva and sclera clear  NECK: Supple, No JVD  CHEST/LUNG: Clear to auscultation bilaterally; No wheeze  HEART: Regular rate and rhythm; No murmurs, rubs, or gallops  ABDOMEN: Soft, Nontender, Nondistended; Bowel sounds present  EXTREMITIES:  2+ Peripheral Pulses, No clubbing, cyanosis, or edema  PSYCH: AAOx3  NEUROLOGY: non-focal  SKIN: No rashes or lesions    LABS:                        10.5   12.29 )-----------( 164      ( 17 Feb 2024 05:50 )             31.4     02-17    140  |  105  |  34<H>  ----------------------------<  138<H>  3.9   |  26  |  0.60    Ca    8.7      17 Feb 2024 05:50  Phos  3.1     02-17  Mg     2.30     02-17    TPro  6.0  /  Alb  3.0<L>  /  TBili  0.2  /  DBili  x   /  AST  61<H>  /  ALT  53<H>  /  AlkPhos  127<H>  02-17          Urinalysis Basic - ( 17 Feb 2024 05:50 )    Color: x / Appearance: x / SG: x / pH: x  Gluc: 138 mg/dL / Ketone: x  / Bili: x / Urobili: x   Blood: x / Protein: x / Nitrite: x   Leuk Esterase: x / RBC: x / WBC x   Sq Epi: x / Non Sq Epi: x / Bacteria: x        RADIOLOGY & ADDITIONAL TESTS:    Imaging Personally Reviewed:    Consultant(s) Notes Reviewed:      Care Discussed with Consultants/Other Providers:

## 2024-02-18 NOTE — DISCHARGE NOTE PROVIDER - HOSPITAL COURSE
HPI:  79 yo M with history of dementia, colon cancer s/p colectomy in 2019, hyperlipidemia, ?ILD presenting with confusion and visual hallucinations for past 2 days. Per daughter at bedside, altagracia has increased confusion for past two days along with visual hallucinations of seeing bugs in the wall. patient used to take levodopa, but has not taken in past year. Wife gave him some last night, and it made his symptoms worse. Patient also endorsing RUQ pain for 2 days that has improved. Additionally, some baseline SOB for patient along with chronic cough, but has been more SOB since yesterday. Patient also with chills, but no recorded fever.     In ED, CT head revealed nonspecific 1.1 x 0.9cm hyperdensity in R cerebellum which may represent hemorrhagic lesion vs calcified mass/cavernoma with rec for MRI brain and starting on dex 4mg q6h. CT of chest with multifocal pneumonia, CTAP with no evidence of bowel obstruction. Received 1 dose of CTX and azithro as well as 1L NS.   (15 Feb 2024 17:13)    Hospital Course:  Pt on CTX and azithro for empiric CAP treatment for multifocal pneumonia. Patient never requiring supplemental O2 during stay. Continued to endorse visual hallucinations during stay. Speech and swallow recommended cine esophagram as patient reported sticking sensation to further evaluate for possible aspiration. Continued on dexamethasone while waiting for brain MRI which showed ***    Important Medication Changes and Reason:    Active or Pending Issues Requiring Follow-up:    Advanced Directives:   [x] Full code  [ ] DNR  [ ] Hospice    Discharge Diagnoses:         HPI:  77 yo M with history of dementia, colon cancer s/p colectomy in 2019, hyperlipidemia, ?ILD presenting with confusion and visual hallucinations for past 2 days. Per daughter at bedside, altagracia has increased confusion for past two days along with visual hallucinations of seeing bugs in the wall. patient used to take levodopa, but has not taken in past year. Wife gave him some last night, and it made his symptoms worse. Patient also endorsing RUQ pain for 2 days that has improved. Additionally, some baseline SOB for patient along with chronic cough, but has been more SOB since yesterday. Patient also with chills, but no recorded fever.     In ED, CT head revealed nonspecific 1.1 x 0.9cm hyperdensity in R cerebellum which may represent hemorrhagic lesion vs calcified mass/cavernoma with rec for MRI brain and starting on dex 4mg q6h. CT of chest with multifocal pneumonia, CTAP with no evidence of bowel obstruction. Received 1 dose of CTX and azithro as well as 1L NS.   (15 Feb 2024 17:13)    Hospital Course:  Pt on CTX and azithro for empiric CAP treatment for multifocal pneumonia which he completed course while inpatient. Patient never requiring supplemental O2 during stay. Continued to endorse visual hallucinations during stay. Speech and swallow recommended cine esophagram as patient reported sticking sensation to further evaluate for possible aspiration. Continued on dexamethasone while waiting for brain MRI which showed no concerning findings to correlate with CT imaging only showing "calcification" Recommended OP follow up with their neurologist to discuss hallucinations.

## 2024-02-18 NOTE — PROGRESS NOTE ADULT - ATTENDING COMMENTS
79 yo M with pmhx of possible dementia, colon cancer s/p colectomy in 2019 (in remission), hyperlipidemia, ILD? presenting with 2d of visual hallucinations. pt reports seeing bugs crawling on the wall. also noted with intermittent confusion. denies fall/head trauma, denies fever/chills, reports cough and uri symptoms prior to that. Denies chest pain. a/w severe sepsis 2/2 CAP and additionally noted with cerebellum lesion.     reports improved hallucinations and confusion. ANO3.     #Severe sepsis: meets severe sepsis criteria given bandemia, tachycardia, elev lactate. AMS 2/2 multifocal PNA. CT Chest with Bilateral patchy   ground-glass and consolidative opacities involving all 5 lobes, compatible with multiple pneumonia. Diffuse bronchial wall thickening.  #CAP  - cont with ceftriaxone and azithromycin  - fu blood,  urine legionella/strep pneu, MRSA/MSSA. urine culture neg  - RVP negative  - monitor pulse ox, vitals, trend wbc    #Brain lesion  CT Head showed hyperdense lesion in the cerebellum  - evaluated by nsgy, recommends dexamethasone 4mg q6hr  - fu MRI Brain     rest as above   dvt: scd, hold for now
78 yr old man PMH colon cancer s/p colectomy in 2019 (in remission), hyperlipidemia, ?ILD, prior falls presenting with 2d of visual hallucinations. Found to have sepsis 2/2 CAP and incidental cerebellum lesion.     Currently AAOX3, daughter at bedside providing Cantonese translation  Calm, cooperative  No gross deficits, moving all extremities spontaneously    C/o right eye blurry vision present for many years progressively worsening, saw outpt optho and was told he has cataracts and astigmatism  Earlier today, was seeing writing and bugs on curtain  Was prescribed donepezil by outpatient neurology, Dr. Laurence Nolasco but patient self dced due to increased urinary frequency    - c/w ceftriaxone, DC azithromycin as urine legionella and strep pneum negative, 2/15 Bcx and Ucx NGTD, RVP negative  - c/w dexamethasone 4mg q6hr per Nsx recs  - f/u MRI Brain (had previously in past after falls per daughter, last being in 2022 and was normal per her)  - resume donepezil 5mg  - S/S recs noted, XR cineesophagogram ordered  - Re: frequent falls in past, daughter states, patient has been having sleep disturbances and nightmares that cause him to fall from bed and last fall in 2022 caused head trauma and bleeding. Also noted that he had a severe fever as a child that caused him to have left sided hearing loss, ?meningitis  PT recs: outpt PT
78 yr old man PMH colon cancer s/p colectomy in 2019 (in remission), hyperlipidemia, ?ILD, prior falls presenting with 2d of visual hallucinations. Found to have sepsis 2/2 CAP and incidental cerebellum lesion.     Currently AAOX3, daughter at bedside providing Cantonese translation  Lungs CTA  Abd soft, NT  No LE edema    - c/w ceftriaxone and azithromycin, 2/15 Bcx and Ucx NGTD, RVP negative  - c/w dexamethasone 4mg q6hr per Nsx recs  - f/u MRI Brain (had previously in past after falls per daughter, last being in 2022 and was normal per her)  - S/S recs noted, XR cineesophagogram ordered  - Re: frequent falls in past, daughter states, patient has been having sleep disturbances and nightmares that cause him to fall from bed and last fall in 2022 caused head trauma and bleeding. Also noted that he had a severe fever as a child that caused him to have left sided hearing loss, ?meningitis  PT recs: outpt PT

## 2024-02-18 NOTE — DISCHARGE NOTE PROVIDER - NSDCMRMEDTOKEN_GEN_ALL_CORE_FT
guaiFENesin 100 mg/5 mL oral liquid: 10 milliliter(s) orally 2 times a day  rosuvastatin 5 mg oral tablet: 1 tab(s) orally once a day (at bedtime)   donepezil 5 mg oral tablet: 1 tab(s) orally once a day (at bedtime)  outpatient Physical therapy: outpatient Physical therapy three times a week  rosuvastatin 5 mg oral tablet: 1 tab(s) orally once a day (at bedtime)  senna leaf extract oral tablet: 2 tab(s) orally once a day (at bedtime)

## 2024-02-18 NOTE — PROGRESS NOTE ADULT - TIME BILLING
Time-based billing (NON-critical care).     [45  ] minutes spent on total encounter; more than 50% of the visit was spent counseling and / or coordinating care by the attending physician.  The necessity of the time spent during the encounter on this date of service was due to:     documentation in [employer], reviewing chart and coordinating care with patient/residents and interdisciplinary staff (such as , social workers, etc) as well as reviewing vitals, laboratory data, radiology, medication list, consultants' recommendations and prior records. Interventions were performed as documented above.
Time spent includes direct patient care  (interview and examination of patient), discussion with other providers, support staff and/or patient's family members, review of medical records, ordering diagnostic tests and analyzing results, and documentation.
Time spent includes direct patient care  (interview and examination of patient), discussion with other providers, support staff and/or patient's family members, review of medical records, ordering diagnostic tests and analyzing results, and documentation.

## 2024-02-18 NOTE — DISCHARGE NOTE PROVIDER - ATTENDING DISCHARGE PHYSICAL EXAMINATION:
General: man sitting up in bed appears comfortable in NAD, awake and alert  Eyes: conjunctiva clear, nonicteric sclera  HENMT: MMM  Respiratory: No respiratory distress, CTABL,  Cardiovascular: S1,S2; Regular rate and rhythm; No m/g/r.   Gastrointestinal: Soft, Nontender, Nondistended; +BS.   Extremities: No c/c/e; warm to touch  Psych:  appropriate mood and affect

## 2024-02-18 NOTE — PROVIDER CONTACT NOTE (OTHER) - ASSESSMENT
Pt has new onset generalized rash. Pt denies itchiness. rash is located on face and hands. Redness present, no burning sensation. No s.s of distress

## 2024-02-18 NOTE — DISCHARGE NOTE PROVIDER - CARE PROVIDER_API CALL
Marco Shin  Family Medicine  [address] [facility] [address] [place] [address]  Phone: [phone]  Fax: [phone]  Established Patient  Follow Up Time:

## 2024-02-18 NOTE — DISCHARGE NOTE PROVIDER - NSDCCPCAREPLAN_GEN_ALL_CORE_FT
PRINCIPAL DISCHARGE DIAGNOSIS  Diagnosis: Multifocal pneumonia  Assessment and Plan of Treatment: You came in because increase in cough, shortness of breath, and visual hallucinations. You were found to have pneumonia in your lungs and so you were started on IV antibiotics. Your cough and shortness of breath improved with the IV antibiotics. You never required supplemental oxygen or had a fever during your stay.   If you have worsening shortness of breath, or feves, and chills, please seek urgent medical care.      SECONDARY DISCHARGE DIAGNOSES  Diagnosis: Lesion of brain  Assessment and Plan of Treatment: A CT head was done during your stay which revealed that you had a 1cm lesion in your cerebellum (part of your brain) which either represented a bleed or a cavernoma (pack of blood vessels). Neurosurgery evaluated you and started you on steroids to prevent expansion of the lesion and recommended an MRI of your brain which showed ***.    Diagnosis: Multifocal pneumonia  Assessment and Plan of Treatment:      PRINCIPAL DISCHARGE DIAGNOSIS  Diagnosis: Multifocal pneumonia  Assessment and Plan of Treatment: You came in because increase in cough, shortness of breath, and visual hallucinations. You were found to have pneumonia in your lungs and so you were started on IV antibiotics. Your cough and shortness of breath improved with the IV antibiotics. You completed course of antibioticvs while inpatient. You never required supplemental oxygen or had a fever during your stay.   If you have worsening shortness of breath, or feves, and chills, please seek urgent medical care.      SECONDARY DISCHARGE DIAGNOSES  Diagnosis: Multifocal pneumonia  Assessment and Plan of Treatment:     Diagnosis: Lesion of brain  Assessment and Plan of Treatment: A CT head was done during your stay which revealed that you had a 1cm lesion in your cerebellum (part of your brain) which either represented a bleed or a cavernoma (pack of blood vessels). Neurosurgery evaluated you and started you on steroids to prevent expansion of the lesion and recommended an MRI of your brain which came back negative for infacrt, hemmorage or mass. You were seen by neurosurgery who do not reccomend further treatment fat this time.

## 2024-02-19 LAB
ALBUMIN SERPL ELPH-MCNC: 2.9 G/DL — LOW (ref 3.3–5)
ALP SERPL-CCNC: 115 U/L — SIGNIFICANT CHANGE UP (ref 40–120)
ALT FLD-CCNC: 73 U/L — HIGH (ref 4–41)
ANION GAP SERPL CALC-SCNC: 10 MMOL/L — SIGNIFICANT CHANGE UP (ref 7–14)
AST SERPL-CCNC: 49 U/L — HIGH (ref 4–40)
BILIRUB SERPL-MCNC: <0.2 MG/DL — SIGNIFICANT CHANGE UP (ref 0.2–1.2)
BUN SERPL-MCNC: 24 MG/DL — HIGH (ref 7–23)
CALCIUM SERPL-MCNC: 8.7 MG/DL — SIGNIFICANT CHANGE UP (ref 8.4–10.5)
CHLORIDE SERPL-SCNC: 104 MMOL/L — SIGNIFICANT CHANGE UP (ref 98–107)
CO2 SERPL-SCNC: 27 MMOL/L — SIGNIFICANT CHANGE UP (ref 22–31)
CREAT SERPL-MCNC: 0.56 MG/DL — SIGNIFICANT CHANGE UP (ref 0.5–1.3)
EGFR: 102 ML/MIN/1.73M2 — SIGNIFICANT CHANGE UP
GLUCOSE SERPL-MCNC: 121 MG/DL — HIGH (ref 70–99)
HCT VFR BLD CALC: 35.5 % — LOW (ref 39–50)
HGB BLD-MCNC: 11.8 G/DL — LOW (ref 13–17)
MAGNESIUM SERPL-MCNC: 2.3 MG/DL — SIGNIFICANT CHANGE UP (ref 1.6–2.6)
MCHC RBC-ENTMCNC: 30.6 PG — SIGNIFICANT CHANGE UP (ref 27–34)
MCHC RBC-ENTMCNC: 33.2 GM/DL — SIGNIFICANT CHANGE UP (ref 32–36)
MCV RBC AUTO: 92.2 FL — SIGNIFICANT CHANGE UP (ref 80–100)
NRBC # BLD: 0 /100 WBCS — SIGNIFICANT CHANGE UP (ref 0–0)
NRBC # FLD: 0 K/UL — SIGNIFICANT CHANGE UP (ref 0–0)
PHOSPHATE SERPL-MCNC: 2.9 MG/DL — SIGNIFICANT CHANGE UP (ref 2.5–4.5)
PLATELET # BLD AUTO: 180 K/UL — SIGNIFICANT CHANGE UP (ref 150–400)
POTASSIUM SERPL-MCNC: 4.4 MMOL/L — SIGNIFICANT CHANGE UP (ref 3.5–5.3)
POTASSIUM SERPL-SCNC: 4.4 MMOL/L — SIGNIFICANT CHANGE UP (ref 3.5–5.3)
PROT SERPL-MCNC: 6.4 G/DL — SIGNIFICANT CHANGE UP (ref 6–8.3)
RBC # BLD: 3.85 M/UL — LOW (ref 4.2–5.8)
RBC # FLD: 12.8 % — SIGNIFICANT CHANGE UP (ref 10.3–14.5)
SODIUM SERPL-SCNC: 141 MMOL/L — SIGNIFICANT CHANGE UP (ref 135–145)
WBC # BLD: 9.64 K/UL — SIGNIFICANT CHANGE UP (ref 3.8–10.5)
WBC # FLD AUTO: 9.64 K/UL — SIGNIFICANT CHANGE UP (ref 3.8–10.5)

## 2024-02-19 PROCEDURE — 99233 SBSQ HOSP IP/OBS HIGH 50: CPT

## 2024-02-19 PROCEDURE — 70553 MRI BRAIN STEM W/O & W/DYE: CPT | Mod: 26

## 2024-02-19 RX ADMIN — Medication 4 MILLIGRAM(S): at 23:08

## 2024-02-19 RX ADMIN — Medication 200 MILLIGRAM(S): at 17:56

## 2024-02-19 RX ADMIN — CEFTRIAXONE 100 MILLIGRAM(S): 500 INJECTION, POWDER, FOR SOLUTION INTRAMUSCULAR; INTRAVENOUS at 17:56

## 2024-02-19 RX ADMIN — Medication 4 MILLIGRAM(S): at 17:56

## 2024-02-19 RX ADMIN — SENNA PLUS 2 TABLET(S): 8.6 TABLET ORAL at 23:08

## 2024-02-19 RX ADMIN — Medication 4 MILLIGRAM(S): at 12:04

## 2024-02-19 RX ADMIN — DONEPEZIL HYDROCHLORIDE 5 MILLIGRAM(S): 10 TABLET, FILM COATED ORAL at 23:08

## 2024-02-19 RX ADMIN — Medication 200 MILLIGRAM(S): at 05:36

## 2024-02-19 RX ADMIN — ATORVASTATIN CALCIUM 20 MILLIGRAM(S): 80 TABLET, FILM COATED ORAL at 23:08

## 2024-02-19 RX ADMIN — Medication 4 MILLIGRAM(S): at 05:36

## 2024-02-19 RX ADMIN — Medication 200 MILLIGRAM(S): at 12:04

## 2024-02-19 RX ADMIN — Medication 200 MILLIGRAM(S): at 23:08

## 2024-02-19 NOTE — PROGRESS NOTE ADULT - SUBJECTIVE AND OBJECTIVE BOX
**********************************************  [facility] Division of Hospital Medicine  Jo Shah MD  Available via MS Teams  Pager: [phone]  **********************************************     Patient is a 78y old  Male who presents with a chief complaint of altered mental status, shortness of breath (18 Feb 2024 11:44)    SUBJECTIVE / OVERNIGHT EVENTS: No acute events overnight. Patient examined at bedside this AM with his wife and daughter present, daughter providing Cantonese interpretation. Pt reports persistent cough productive of clear sputum and still having some visual hallucinations, such as seeing letters on the curtain that aren't there. No longer distressing to patient. Denies CP, palpitations, SOB, n/v/d, abdominal pain.     OBJECTIVE:  Vital Signs Last 24 Hrs  T(C): 36.5 (19 Feb 2024 11:59), Max: 36.8 (18 Feb 2024 22:29)  T(F): 97.7 (19 Feb 2024 11:59), Max: 98.3 (18 Feb 2024 22:29)  HR: 62 (19 Feb 2024 11:59) (62 - 91)  BP: 126/71 (19 Feb 2024 11:59) (126/71 - 130/62)  BP(mean): --  RR: 17 (19 Feb 2024 11:59) (17 - 18)  SpO2: 96% (19 Feb 2024 11:59) (96% - 100%)    Parameters below as of 19 Feb 2024 11:59  Patient On (Oxygen Delivery Method): room air      Physical Exam:     General: No acute distress, well-appearing    Eyes: PERRL, EOMI     ENT: MMM, no oropharyngeal lesions or erythema appreciated     Pulm: No increased WOB. CTAB. No wheezing.     CV: RRR. S1&S2+. No M/R/G appreciated.     Abdomen: +BS. Soft, NTND. No organomegaly.     MSK: Nml ROM    Extremities: No peripheral edema or cyanosis.     Neuro: A&Ox3, no focal deficits     Skin: Warm and dry. No visible rash.       Labs:  CAPILLARY BLOOD GLUCOSE                              11.8   9.64  )-----------( 180      ( 19 Feb 2024 05:55 )             35.5     02-19    141  |  104  |  24<H>  ----------------------------<  121<H>  4.4   |  27  |  0.56    Ca    8.7      19 Feb 2024 05:55  Phos  2.9     02-19  Mg     2.30     02-19    TPro  6.4  /  Alb  2.9<L>  /  TBili  <0.2  /  DBili  x   /  AST  49<H>  /  ALT  73<H>  /  AlkPhos  115  02-19            Culture - Sputum (collected 17 Feb 2024 09:58)  Source: .Sputum Sputum  Gram Stain (17 Feb 2024 20:21):    Numerous Squamous epithelial cells per low power field    Rare polymorphonuclear leukocytes per low power field    Moderate Gram Negative Rods per oil power field    Few Gram Positive Cocci in Pairs and Chains per oil power field  Preliminary Report (18 Feb 2024 11:53):    Normal Respiratory Mary present      Urinalysis Basic - ( 19 Feb 2024 05:55 )    Color: x / Appearance: x / SG: x / pH: x  Gluc: 121 mg/dL / Ketone: x  / Bili: x / Urobili: x   Blood: x / Protein: x / Nitrite: x   Leuk Esterase: x / RBC: x / WBC x   Sq Epi: x / Non Sq Epi: x / Bacteria: x      Imaging Personally Reviewed:      MEDICATIONS  (STANDING):  atorvastatin 20 milliGRAM(s) Oral at bedtime  dexAMETHasone     Tablet 4 milliGRAM(s) Oral every 6 hours  donepezil 5 milliGRAM(s) Oral at bedtime  guaiFENesin Oral Liquid (Sugar-Free) 200 milliGRAM(s) Oral every 6 hours  senna 2 Tablet(s) Oral at bedtime    MEDICATIONS  (PRN):  acetaminophen     Tablet .. 650 milliGRAM(s) Oral every 6 hours PRN Temp greater or equal to 38C (100.4F), Mild Pain (1 - 3)  albuterol/ipratropium for Nebulization 3 milliLiter(s) Nebulizer every 6 hours PRN Shortness of Breath and/or Wheezing

## 2024-02-19 NOTE — PROGRESS NOTE ADULT - PROBLEM SELECTOR PLAN 2
Increased SOB for past few days, with CT findings of multifocal PNA  - empiric CTX x5d (2/15 - 2/19)  - azithro 500 x3d (2/15-2/17)  - f/u  urine legionella -neg  - f/u urine strep -neg  - sputum culture - mod GNR, some GPC   - duonebs prn

## 2024-02-20 LAB
ALBUMIN SERPL ELPH-MCNC: 2.9 G/DL — LOW (ref 3.3–5)
ALP SERPL-CCNC: 102 U/L — SIGNIFICANT CHANGE UP (ref 40–120)
ALT FLD-CCNC: 62 U/L — HIGH (ref 4–41)
ANION GAP SERPL CALC-SCNC: 7 MMOL/L — SIGNIFICANT CHANGE UP (ref 7–14)
AST SERPL-CCNC: 31 U/L — SIGNIFICANT CHANGE UP (ref 4–40)
BILIRUB SERPL-MCNC: <0.2 MG/DL — SIGNIFICANT CHANGE UP (ref 0.2–1.2)
BUN SERPL-MCNC: 21 MG/DL — SIGNIFICANT CHANGE UP (ref 7–23)
CALCIUM SERPL-MCNC: 8.4 MG/DL — SIGNIFICANT CHANGE UP (ref 8.4–10.5)
CHLORIDE SERPL-SCNC: 102 MMOL/L — SIGNIFICANT CHANGE UP (ref 98–107)
CO2 SERPL-SCNC: 30 MMOL/L — SIGNIFICANT CHANGE UP (ref 22–31)
CREAT SERPL-MCNC: 0.58 MG/DL — SIGNIFICANT CHANGE UP (ref 0.5–1.3)
CULTURE RESULTS: SIGNIFICANT CHANGE UP
CULTURE RESULTS: SIGNIFICANT CHANGE UP
EGFR: 101 ML/MIN/1.73M2 — SIGNIFICANT CHANGE UP
GLUCOSE BLDC GLUCOMTR-MCNC: 112 MG/DL — HIGH (ref 70–99)
GLUCOSE BLDC GLUCOMTR-MCNC: 122 MG/DL — HIGH (ref 70–99)
GLUCOSE SERPL-MCNC: 123 MG/DL — HIGH (ref 70–99)
HCT VFR BLD CALC: 36.3 % — LOW (ref 39–50)
HGB BLD-MCNC: 11.8 G/DL — LOW (ref 13–17)
MAGNESIUM SERPL-MCNC: 2.3 MG/DL — SIGNIFICANT CHANGE UP (ref 1.6–2.6)
MCHC RBC-ENTMCNC: 29.9 PG — SIGNIFICANT CHANGE UP (ref 27–34)
MCHC RBC-ENTMCNC: 32.5 GM/DL — SIGNIFICANT CHANGE UP (ref 32–36)
MCV RBC AUTO: 91.9 FL — SIGNIFICANT CHANGE UP (ref 80–100)
NRBC # BLD: 0 /100 WBCS — SIGNIFICANT CHANGE UP (ref 0–0)
NRBC # FLD: 0 K/UL — SIGNIFICANT CHANGE UP (ref 0–0)
PHOSPHATE SERPL-MCNC: 3.2 MG/DL — SIGNIFICANT CHANGE UP (ref 2.5–4.5)
PLATELET # BLD AUTO: 206 K/UL — SIGNIFICANT CHANGE UP (ref 150–400)
POTASSIUM SERPL-MCNC: 4 MMOL/L — SIGNIFICANT CHANGE UP (ref 3.5–5.3)
POTASSIUM SERPL-SCNC: 4 MMOL/L — SIGNIFICANT CHANGE UP (ref 3.5–5.3)
PROT SERPL-MCNC: 6.1 G/DL — SIGNIFICANT CHANGE UP (ref 6–8.3)
RBC # BLD: 3.95 M/UL — LOW (ref 4.2–5.8)
RBC # FLD: 12.6 % — SIGNIFICANT CHANGE UP (ref 10.3–14.5)
SODIUM SERPL-SCNC: 139 MMOL/L — SIGNIFICANT CHANGE UP (ref 135–145)
SPECIMEN SOURCE: SIGNIFICANT CHANGE UP
SPECIMEN SOURCE: SIGNIFICANT CHANGE UP
WBC # BLD: 9.92 K/UL — SIGNIFICANT CHANGE UP (ref 3.8–10.5)
WBC # FLD AUTO: 9.92 K/UL — SIGNIFICANT CHANGE UP (ref 3.8–10.5)

## 2024-02-20 PROCEDURE — 99232 SBSQ HOSP IP/OBS MODERATE 35: CPT

## 2024-02-20 PROCEDURE — 74230 X-RAY XM SWLNG FUNCJ C+: CPT | Mod: 26

## 2024-02-20 RX ADMIN — DONEPEZIL HYDROCHLORIDE 5 MILLIGRAM(S): 10 TABLET, FILM COATED ORAL at 21:24

## 2024-02-20 RX ADMIN — ATORVASTATIN CALCIUM 20 MILLIGRAM(S): 80 TABLET, FILM COATED ORAL at 21:23

## 2024-02-20 RX ADMIN — Medication 200 MILLIGRAM(S): at 12:58

## 2024-02-20 RX ADMIN — SENNA PLUS 2 TABLET(S): 8.6 TABLET ORAL at 21:23

## 2024-02-20 RX ADMIN — Medication 4 MILLIGRAM(S): at 12:58

## 2024-02-20 RX ADMIN — Medication 4 MILLIGRAM(S): at 06:00

## 2024-02-20 NOTE — PROGRESS NOTE ADULT - PROBLEM SELECTOR PLAN 6
on rosuvastatin 5mg at home  - atorvastatin 20mg at bedtime

## 2024-02-20 NOTE — SWALLOW VFSS/MBS ASSESSMENT ADULT - COMMENTS
Progress Note- Hospitalist 2/19: "79 yo M with history of dementia, colon cancer s/p colectomy in 2019, hyperlipidemia, ?ILD presenting with confusion and visual hallucinations for past 2 days found to have multifocal pneumonia on CT chest along with hyperdense lesion in CT head concerning for hemorrhagic lesion vs cavernoma, pending MRI."    Of note, patient seen for clinical swallow evaluation on 2/17 with recommendations of Soft and Bite Size Solids with Mildly Thick Liquids and a Cinesophagram (see report for details)     Patient received in Radiology Suite this AM for a Cinesophagram. Patient transferred to a specialized seating unit with lateral view projection. Progress Note- Hospitalist 2/19: "77 yo M with history of dementia, colon cancer s/p colectomy in 2019, hyperlipidemia, ?ILD presenting with confusion and visual hallucinations for past 2 days found to have multifocal pneumonia on CT chest along with hyperdense lesion in CT head concerning for hemorrhagic lesion vs cavernoma, pending MRI."    Of note, patient seen for clinical swallow evaluation on 2/17 with recommendations of Soft and Bite Size Solids with Mildly Thick Liquids and a Cinesophagram (see report for details)     Patient received in Radiology Suite this AM for a Cinesophagram. Patient transferred to a specialized seating unit with lateral view projection. Patient is Cantonese speaking, [employer] (ID#[id number]) utilized for translation.

## 2024-02-20 NOTE — PROGRESS NOTE ADULT - PROBLEM SELECTOR PLAN 7
DVT prophy: scds given possible hemorrhagic lesion  Diet: Regular

## 2024-02-20 NOTE — SWALLOW VFSS/MBS ASSESSMENT ADULT - RECOMMENDED CONSISTENCY
1. Soft and Bite Size Solids with Mildly Thick Liquids  2. Swallowing Guidelines: Seated Upright during Mealtimes; Slow Pacing; Small Bites/Small Sips  3. Maintain Adequate Oral Hygiene  4. Aspiration and Reflux Precautions

## 2024-02-20 NOTE — PROGRESS NOTE ADULT - PROBLEM SELECTOR PLAN 3
1.1 x 0.9cm hyperdense lesion of brain concerning for hemorrhagic lesion vs. cavernoma  - neurosurgery consulted  - recommend dex 4mg q6h and if hallucinations get worse, can stop  - brain MRI w/wo contrast with no infarct, mass, or hemorrhage. Lesion on CT likely secondary to calcification. NSx consult appreciated, no need for follow up.
1.1 x 0.9cm hyperdense lesion of brain concerning for hemorrhagic lesion vs. cavernoma  - neurosurgery consulted  - recommend dex 4mg q6h and if hallucinations get worse, can stop  - brain MRI w/wo contrast --> neurosurg stating pt should get MRI inpatient
1.1 x 0.9cm hyperdense lesion of brain concerning for hemorrhagic lesion vs. cavernoma  - neurosurgery consulted  - recommend dex 4mg q6h and if hallucinations get worse, can stop  - brain MRI w/wo contrast --> f/u neurosurg on how urgent the MRI is or if can get outpatient.
1.1 x 0.9cm hyperdense lesion of brain concerning for hemorrhagic lesion vs. cavernoma  - neurosurgery consulted  - recommend dex 4mg q6h and if hallucinations get worse, can stop  - brain MRI w/wo contrast --> neurosurg stating pt should get MRI inpatient
1.1 x 0.9cm hyperdense lesion of brain concerning for hemorrhagic lesion vs. cavernoma  - neurosurgery consulted  - recommend dex 4mg q6h and if hallucinations get worse, can stop  - brain MRI w/wo contrast --> neurosurg stating pt should get MRI inpatient

## 2024-02-20 NOTE — SWALLOW VFSS/MBS ASSESSMENT ADULT - DIAGNOSTIC IMPRESSIONS
1) Mild Oral Stage for Puree, Regular Solids, Moderately Thick Liquids, Mildly Thick Liquids, and Thin Liquids characterized by adequate oral containment, adequate bolus manipulation, slow mastication for Regular Solids, slow anterior to posterior transfer with piecemeal transfer/deglutition (2 swallows) for Regular Solids/Puree, adequate oral clearance.   2) Mild Pharyngeal Stage for Puree, Regular Solids, Moderately Thick Liquids, and Mildly Thick Liquids characterized by adequate initiation of the pharyngeal swallow, adequate laryngeal elevation, adequate laryngeal vestibule closure, reduced base of tongue of retraction, adequate epiglottic deflection, reduced pharyngeal contractility. There is Mild to Moderate Pharyngeal Clearance deficits greater at the vallecular than pyriforms post primary swallow greater for Puree/Moderately Thick Liquids. Spontaneous reswallow and liquid wash partially assist with pharyngeal clearance. There is No Aspiration before, during, or after the swallow for Puree, Regular Solids, Moderately Thick Liquids, and Mildly Thick Liquids.   3) Severe Pharyngeal Stage for Thin Liquids characterized by initiation of the pharyngeal swallow, reduced laryngeal elevation, reduced laryngeal vestibule closure, reduced base of tongue retraction, reduced epiglottic deflection, reduced pharyngeal contractility. There is Aspiration during the swallow. Patient is sensate given cough response. Patient given verbal cue to cough however unable to produce strong volitional cough to clear contrast from upper airway/trachea.

## 2024-02-20 NOTE — PROGRESS NOTE ADULT - PROBLEM SELECTOR PLAN 4
per daughter, patient at baseline still drives and takes care of self as recently as last week  - no parkinsons per outpatient neurologist
per daughter, patient at baseline still drives and takes care of self as recently as last week  - no parkinsons per outpatient neurologist  - will need to follow up with neurology as outpatient
per daughter, patient at baseline still drives and takes care of self as recently as last week  - no parkinsons per outpatient neurologist

## 2024-02-20 NOTE — PROGRESS NOTE ADULT - PROBLEM SELECTOR PROBLEM 2
Multifocal pneumonia

## 2024-02-20 NOTE — PROGRESS NOTE ADULT - PROBLEM SELECTOR PROBLEM 5
Malignant neoplasm of colon

## 2024-02-20 NOTE — PROGRESS NOTE ADULT - NUTRITIONAL ASSESSMENT
This patient has been assessed with a concern for Malnutrition and has been determined to have a diagnosis/diagnoses of Severe protein-calorie malnutrition and Underweight (BMI < 19).    This patient is being managed with:   Diet Soft and Bite Sized-  Mildly Thick Liquids (MILDTHICKLIQS)  Entered: Feb 17 2024  1:41PM  
This patient has been assessed with a concern for Malnutrition and has been determined to have a diagnosis/diagnoses of Severe protein-calorie malnutrition and Underweight (BMI < 19).    This patient is being managed with:   Diet Regular-  Soft and Bite Sized (SOFTBTSZ)  Supplement Feeding Modality:  Oral  Ensure Plus High Protein Cans or Servings Per Day:  1       Frequency:  Two Times a day  Entered: Feb 16 2024  6:11PM  
This patient has been assessed with a concern for Malnutrition and has been determined to have a diagnosis/diagnoses of Severe protein-calorie malnutrition and Underweight (BMI < 19).    This patient is being managed with:   Diet Soft and Bite Sized-  Mildly Thick Liquids (MILDTHICKLIQS)  Entered: Feb 17 2024  1:41PM  
This patient has been assessed with a concern for Malnutrition and has been determined to have a diagnosis/diagnoses of Severe protein-calorie malnutrition and Underweight (BMI < 19).    This patient is being managed with:   Diet Soft and Bite Sized-  Mildly Thick Liquids (MILDTHICKLIQS)  Entered: Feb 17 2024  1:41PM

## 2024-02-20 NOTE — CHART NOTE - NSCHARTNOTEFT_GEN_A_CORE
Neurosurgery Follow Up:   MRI brain showed no infarct, no mass, no hemorrhage. Case and images reviewed with attending. No acute neurosurgical intervention. No neurosurgical f/u needed. Reconsult PRN.

## 2024-02-20 NOTE — PROGRESS NOTE ADULT - REASON FOR ADMISSION
altered mental status, shortness of breath

## 2024-02-20 NOTE — PROGRESS NOTE ADULT - ASSESSMENT
79 yo M with history of dementia, colon cancer s/p colectomy in 2019, hyperlipidemia, ?ILD presenting with confusion and visual hallucinations for past 2 days found to have multifocal pneumonia on CT chest along with hyperdense lesion in CT head concerning for hemorrhagic lesion vs cavernoma, pending MRI. 
77 yo M with history of dementia, colon cancer s/p colectomy in 2019, hyperlipidemia, ?ILD presenting with confusion and visual hallucinations for past 2 days found to have multifocal pneumonia on CT chest along with hyperdense lesion in CT head concerning for hemorrhagic lesion vs cavernoma, pending MRI. 
78M with history of dementia, colon cancer s/p colectomy in 2019, hyperlipidemia, ?ILD presenting with confusion and visual hallucinations for past 2 days found to have multifocal pneumonia on CT chest along with hyperdense lesion in CT head concerning for hemorrhagic lesion vs cavernoma, pending MRI. 
77 yo M with history of dementia, colon cancer s/p colectomy in 2019, hyperlipidemia, ?ILD presenting with confusion and visual hallucinations for past 2 days found to have multifocal pneumonia on CT chest along with hyperdense lesion in CT head concerning for hemorrhagic lesion vs cavernoma, pending MRI. 
77 yo M with history of dementia, colon cancer s/p colectomy in 2019, hyperlipidemia, ?ILD presenting with confusion and visual hallucinations for past 2 days found to have multifocal pneumonia on CT chest along with hyperdense lesion in CT head concerning for hemorrhagic lesion vs cavernoma, pending MRI.

## 2024-02-20 NOTE — PROGRESS NOTE ADULT - SUBJECTIVE AND OBJECTIVE BOX
Patient is a 78y old  Male who presents with a chief complaint of altered mental status, shortness of breath (19 Feb 2024 20:29)    Dmitry Fitzgerald MD   [facility]   Pager [phone]  Reachable on [employer] Teams     SUBJECTIVE / OVERNIGHT EVENTS:  Patient seen and examined this morning with [employer] phone . No events overnight.   Patient states that hallucinations are improving. No longer seeing crawling but still seeing color changes which is improving.   No chest pain or shortness of breath.     MEDICATIONS  (STANDING):  atorvastatin 20 milliGRAM(s) Oral at bedtime  dexAMETHasone     Tablet 4 milliGRAM(s) Oral every 6 hours  donepezil 5 milliGRAM(s) Oral at bedtime  senna 2 Tablet(s) Oral at bedtime    MEDICATIONS  (PRN):  acetaminophen     Tablet .. 650 milliGRAM(s) Oral every 6 hours PRN Temp greater or equal to 38C (100.4F), Mild Pain (1 - 3)  albuterol/ipratropium for Nebulization 3 milliLiter(s) Nebulizer every 6 hours PRN Shortness of Breath and/or Wheezing      Vital Signs Last 24 Hrs  T(C): 36.8 (20 Feb 2024 13:54), Max: 36.8 (20 Feb 2024 13:54)  T(F): 98.2 (20 Feb 2024 13:54), Max: 98.2 (20 Feb 2024 13:54)  HR: 64 (20 Feb 2024 13:54) (61 - 64)  BP: 115/74 (20 Feb 2024 13:54) (115/58 - 140/84)  BP(mean): --  RR: 16 (20 Feb 2024 13:54) (16 - 16)  SpO2: 100% (20 Feb 2024 13:54) (99% - 100%)  CAPILLARY BLOOD GLUCOSE        I&O's Summary      General: man sitting up in bed appears comfortable in NAD, awake and alert  Eyes: conjunctiva clear, nonicteric sclera  HENMT: MMM  Respiratory: No respiratory distress, CTABL,  Cardiovascular: S1,S2; Regular rate and rhythm; No m/g/r.   Gastrointestinal: Soft, Nontender, Nondistended; +BS.   Extremities: No c/c/e; warm to touch  Psych:  appropriate mood and affect    LABS:                        11.8   9.92  )-----------( 206      ( 20 Feb 2024 05:00 )             36.3     02-20    139  |  102  |  21  ----------------------------<  123<H>  4.0   |  30  |  0.58    Ca    8.4      20 Feb 2024 05:00  Phos  3.2     02-20  Mg     2.30     02-20    TPro  6.1  /  Alb  2.9<L>  /  TBili  <0.2  /  DBili  x   /  AST  31  /  ALT  62<H>  /  AlkPhos  102  02-20          Urinalysis Basic - ( 20 Feb 2024 05:00 )    Color: x / Appearance: x / SG: x / pH: x  Gluc: 123 mg/dL / Ketone: x  / Bili: x / Urobili: x   Blood: x / Protein: x / Nitrite: x   Leuk Esterase: x / RBC: x / WBC x   Sq Epi: x / Non Sq Epi: x / Bacteria: x        RADIOLOGY & ADDITIONAL TESTS:    Imaging Personally Reviewed:    Consultant(s) Notes Reviewed:      Care Discussed with Consultants/Other Providers: MJ davison

## 2024-02-20 NOTE — PROGRESS NOTE ADULT - PROBLEM SELECTOR PLAN 1
WBC 10.4 but with 9.6% bands, HR >90, lactate 2.4, now normalized to 1  multifocal PNA on CT chest, with increased SOB  UA with no bacteria, 11 epithelial cells  - treatment of PNA as below  - f/u Bcx2 (drawn 2 hours after abx)  - f/u Ucx  - RVP
WBC 10.4 but with 9.6% bands, HR >90, lactate 2.4, now normalized to 1  multifocal PNA on CT chest, with increased SOB  UA with no bacteria, 11 epithelial cells  - c/w CTX x 5d   - S/p azithro x 3d   - Bcx2 NGTD  - Ucx wnl  - RVP neg
WBC 10.4 but with 9.6% bands, HR >90, lactate 2.4, now normalized to 1  multifocal PNA on CT chest, with increased SOB  UA with no bacteria, 11 epithelial cells  - c/w CTX x 5d   - S/p azithro x 3d   - BCx2 NGTD  - Ucx wnl  - RVP neg
WBC 10.4 but with 9.6% bands, HR >90, lactate 2.4, now normalized to 1  multifocal PNA on CT chest, with increased SOB  UA with no bacteria, 11 epithelial cells  - treatment of PNA as below  - Bcx2 NGTD  - Ucx wnl  - RVP neg
WBC 10.4 but with 9.6% bands, HR >90, lactate 2.4, now normalized to 1  multifocal PNA on CT chest, with increased SOB  UA with no bacteria, 11 epithelial cells  - treatment of PNA as below  - Bcx2 NGTD  - Ucx wnl  - RVP neg
Suturegard Intro: Intraoperative tissue expansion was performed, utilizing the SUTUREGARD device, in order to reduce wound tension.

## 2024-02-21 ENCOUNTER — TRANSCRIPTION ENCOUNTER (OUTPATIENT)
Age: 79
End: 2024-02-21

## 2024-02-21 VITALS
HEART RATE: 69 BPM | DIASTOLIC BLOOD PRESSURE: 74 MMHG | SYSTOLIC BLOOD PRESSURE: 119 MMHG | OXYGEN SATURATION: 97 % | TEMPERATURE: 98 F | RESPIRATION RATE: 17 BRPM

## 2024-02-21 LAB
ANION GAP SERPL CALC-SCNC: 8 MMOL/L — SIGNIFICANT CHANGE UP (ref 7–14)
BUN SERPL-MCNC: 20 MG/DL — SIGNIFICANT CHANGE UP (ref 7–23)
CALCIUM SERPL-MCNC: 8.6 MG/DL — SIGNIFICANT CHANGE UP (ref 8.4–10.5)
CHLORIDE SERPL-SCNC: 100 MMOL/L — SIGNIFICANT CHANGE UP (ref 98–107)
CO2 SERPL-SCNC: 30 MMOL/L — SIGNIFICANT CHANGE UP (ref 22–31)
CREAT SERPL-MCNC: 0.65 MG/DL — SIGNIFICANT CHANGE UP (ref 0.5–1.3)
EGFR: 98 ML/MIN/1.73M2 — SIGNIFICANT CHANGE UP
GLUCOSE SERPL-MCNC: 79 MG/DL — SIGNIFICANT CHANGE UP (ref 70–99)
HCT VFR BLD CALC: 40.4 % — SIGNIFICANT CHANGE UP (ref 39–50)
HGB BLD-MCNC: 13.4 G/DL — SIGNIFICANT CHANGE UP (ref 13–17)
MAGNESIUM SERPL-MCNC: 2.3 MG/DL — SIGNIFICANT CHANGE UP (ref 1.6–2.6)
MCHC RBC-ENTMCNC: 30.1 PG — SIGNIFICANT CHANGE UP (ref 27–34)
MCHC RBC-ENTMCNC: 33.2 GM/DL — SIGNIFICANT CHANGE UP (ref 32–36)
MCV RBC AUTO: 90.8 FL — SIGNIFICANT CHANGE UP (ref 80–100)
NRBC # BLD: 0 /100 WBCS — SIGNIFICANT CHANGE UP (ref 0–0)
NRBC # FLD: 0 K/UL — SIGNIFICANT CHANGE UP (ref 0–0)
PHOSPHATE SERPL-MCNC: 3.3 MG/DL — SIGNIFICANT CHANGE UP (ref 2.5–4.5)
PLATELET # BLD AUTO: 269 K/UL — SIGNIFICANT CHANGE UP (ref 150–400)
POTASSIUM SERPL-MCNC: 4.3 MMOL/L — SIGNIFICANT CHANGE UP (ref 3.5–5.3)
POTASSIUM SERPL-SCNC: 4.3 MMOL/L — SIGNIFICANT CHANGE UP (ref 3.5–5.3)
RBC # BLD: 4.45 M/UL — SIGNIFICANT CHANGE UP (ref 4.2–5.8)
RBC # FLD: 12.7 % — SIGNIFICANT CHANGE UP (ref 10.3–14.5)
SODIUM SERPL-SCNC: 138 MMOL/L — SIGNIFICANT CHANGE UP (ref 135–145)
WBC # BLD: 10.78 K/UL — HIGH (ref 3.8–10.5)
WBC # FLD AUTO: 10.78 K/UL — HIGH (ref 3.8–10.5)

## 2024-02-21 PROCEDURE — 99239 HOSP IP/OBS DSCHRG MGMT >30: CPT

## 2024-02-21 PROCEDURE — 71045 X-RAY EXAM CHEST 1 VIEW: CPT | Mod: 26

## 2024-02-21 RX ORDER — SENNA PLUS 8.6 MG/1
2 TABLET ORAL
Qty: 0 | Refills: 0 | DISCHARGE
Start: 2024-02-21

## 2024-02-21 RX ORDER — DONEPEZIL HYDROCHLORIDE 10 MG/1
1 TABLET, FILM COATED ORAL
Qty: 30 | Refills: 0
Start: 2024-02-21 | End: 2024-03-21

## 2024-02-21 NOTE — DISCHARGE NOTE NURSING/CASE MANAGEMENT/SOCIAL WORK - PATIENT PORTAL LINK FT
You can access the FollowMyHealth Patient Portal offered by [facility] by registering at the following website: [email or web address]. By joining [employer]’s FollowMyHealth portal, you will also be able to view your health information using other applications (apps) compatible with our system.

## 2024-02-22 LAB
CULTURE RESULTS: ABNORMAL
SPECIMEN SOURCE: SIGNIFICANT CHANGE UP

## 2024-02-26 ENCOUNTER — TRANSCRIPTION ENCOUNTER (OUTPATIENT)
Age: 79
End: 2024-02-26

## 2024-09-05 NOTE — DIETITIAN INITIAL EVALUATION ADULT - PERTINENT LABORATORY DATA
Patient called asking why this PA is taking so long, she needs to administer medication this week to stay on track and would like to know if there is anyway we can reach out to the PA team for an expediting process on this.   Patients insurance also called asking about this PA and why this is taking so long.    02-16    138  |  103  |  28<H>  ----------------------------<  138<H>  3.7   |  25  |  0.63    Ca    8.6      16 Feb 2024 06:00  Phos  2.8     02-16  Mg     2.30     02-16    TPro  6.6  /  Alb  3.0<L>  /  TBili  0.5  /  DBili  x   /  AST  32  /  ALT  35  /  AlkPhos  113  02-16

## 2025-06-24 ENCOUNTER — NON-APPOINTMENT (OUTPATIENT)
Age: 80
End: 2025-06-24

## 2025-06-24 ENCOUNTER — APPOINTMENT (OUTPATIENT)
Dept: UROLOGY | Facility: CLINIC | Age: 80
End: 2025-06-24
Payer: MEDICARE

## 2025-06-24 VITALS
WEIGHT: 101 LBS | HEART RATE: 81 BPM | SYSTOLIC BLOOD PRESSURE: 110 MMHG | RESPIRATION RATE: 18 BRPM | BODY MASS INDEX: 16.55 KG/M2 | OXYGEN SATURATION: 97 % | DIASTOLIC BLOOD PRESSURE: 74 MMHG

## 2025-06-24 PROCEDURE — 99204 OFFICE O/P NEW MOD 45 MIN: CPT

## 2025-06-24 PROCEDURE — G2211 COMPLEX E/M VISIT ADD ON: CPT

## 2025-06-24 RX ORDER — SODIUM PHOSPHATE, DIBASIC AND SODIUM PHOSPHATE, MONOBASIC 7; 19 G/230ML; G/230ML
ENEMA RECTAL
Qty: 1 | Refills: 0 | Status: ACTIVE | COMMUNITY
Start: 2025-06-24 | End: 1900-01-01

## 2025-06-25 LAB
ANION GAP SERPL CALC-SCNC: 15 MMOL/L
BUN SERPL-MCNC: 19 MG/DL
CALCIUM SERPL-MCNC: 9.2 MG/DL
CHLORIDE SERPL-SCNC: 101 MMOL/L
CO2 SERPL-SCNC: 24 MMOL/L
CREAT SERPL-MCNC: 0.94 MG/DL
EGFRCR SERPLBLD CKD-EPI 2021: 83 ML/MIN/1.73M2
GLUCOSE SERPL-MCNC: 88 MG/DL
POTASSIUM SERPL-SCNC: 4.7 MMOL/L
PSA FREE FLD-MCNC: 10 %
PSA FREE SERPL-MCNC: 0.22 NG/ML
PSA SERPL-MCNC: 2.15 NG/ML
SODIUM SERPL-SCNC: 141 MMOL/L

## 2025-07-07 NOTE — H&P PST ADULT - RS GEN PE MLT RESP DETAILS PC
FYI   breath sounds equal/no chest wall tenderness/clear to auscultation bilaterally/respirations non-labored/airway patent/good air movement

## 2025-07-15 ENCOUNTER — NON-APPOINTMENT (OUTPATIENT)
Age: 80
End: 2025-07-15